# Patient Record
Sex: FEMALE | Race: WHITE | NOT HISPANIC OR LATINO | URBAN - METROPOLITAN AREA
[De-identification: names, ages, dates, MRNs, and addresses within clinical notes are randomized per-mention and may not be internally consistent; named-entity substitution may affect disease eponyms.]

---

## 2018-02-25 ENCOUNTER — EMERGENCY (EMERGENCY)
Facility: HOSPITAL | Age: 53
LOS: 1 days | Discharge: HOME | End: 2018-02-25
Attending: EMERGENCY MEDICINE | Admitting: EMERGENCY MEDICINE
Payer: MEDICAID

## 2018-02-25 VITALS
HEART RATE: 71 BPM | DIASTOLIC BLOOD PRESSURE: 65 MMHG | OXYGEN SATURATION: 97 % | TEMPERATURE: 100 F | RESPIRATION RATE: 18 BRPM | SYSTOLIC BLOOD PRESSURE: 128 MMHG

## 2018-02-25 VITALS
OXYGEN SATURATION: 97 % | RESPIRATION RATE: 18 BRPM | HEART RATE: 86 BPM | DIASTOLIC BLOOD PRESSURE: 71 MMHG | SYSTOLIC BLOOD PRESSURE: 99 MMHG | TEMPERATURE: 99 F

## 2018-02-25 LAB — S PYO AG SPEC QL IA: NEGATIVE — SIGNIFICANT CHANGE UP

## 2018-02-25 PROCEDURE — 87880 STREP A ASSAY W/OPTIC: CPT

## 2018-02-25 PROCEDURE — 99283 EMERGENCY DEPT VISIT LOW MDM: CPT

## 2018-02-25 PROCEDURE — 87081 CULTURE SCREEN ONLY: CPT

## 2018-02-25 PROCEDURE — 99282 EMERGENCY DEPT VISIT SF MDM: CPT

## 2018-02-25 RX ORDER — FLUTICASONE PROPIONATE 50 MCG
2 SPRAY, SUSPENSION NASAL
Qty: 1 | Refills: 0
Start: 2018-02-25 | End: 2018-03-01

## 2018-02-25 RX ORDER — FLUTICASONE PROPIONATE 50 MCG
2 SPRAY, SUSPENSION NASAL
Qty: 1 | Refills: 0 | OUTPATIENT
Start: 2018-02-25 | End: 2018-03-01

## 2018-02-25 RX ORDER — PSEUDOEPHEDRINE HCL 30 MG
1 TABLET ORAL
Qty: 10 | Refills: 0
Start: 2018-02-25 | End: 2018-03-01

## 2018-02-25 NOTE — ED ADULT NURSE NOTE - OBJECTIVE STATEMENT
51 y/o F patient presents to ED from home c/o sore throat and congestion x2 days. Patient states she has an intermittent dull left sided chest pain with cough occasionally. Patient states she took Advil this morning with some relief of symptoms. Patient denies sick contacts at home. Upon assessment, patient is A&Ox3. speech clear and coherent. throat free of redness, swelling, exudate. non tender to touch. lung sounds CTA. skin warm, dry, intact. cap refill <3 sec. abdomen soft, non tender, non distended. ambulatory. Patient denies HA, dizziness, SOB, abdominal pain, N/V/D, bowel/bladder changes, numbness/tingling, dysuria, fevers/chills. VSS. Safety and comfort measures provided and maintained. MD at bedside.

## 2018-02-25 NOTE — ED PROVIDER NOTE - ENMT, MLM
Airway patent, Nasal mucosa clear. Mouth with normal mucosa. Throat has no vesicles, no oropharyngeal exudates and uvula is midline, mild erythema to posterior pharynx. Airway patent, Nasal mucosa clear. Mouth with normal mucosa. Throat has no vesicles, no oropharyngeal exudates and uvula is midline, mild erythema to posterior pharynx. no maxillary or frontal sinus tenderness.

## 2018-02-25 NOTE — ED PROVIDER NOTE - ENMT NEGATIVE STATEMENT, MLM
Ears: no ear pain and no hearing problems.Mouth/Throat: no dysphagia, no hoarseness .Neck: no lumps, no pain, no stiffness and no swollen glands.

## 2018-02-25 NOTE — ED PROVIDER NOTE - OBJECTIVE STATEMENT
51 yo female with PMH of hypothyroidism, tubal ligation seen for 2 day history of nasal congestion, worsening throat and neck pain which started abruptly and has been worsening, throat pain worse when eating/drinking and moving neck. no recent trauma. she took advil once yesterday which did not alleviate symptoms, did not take anything today to relieve symptoms. notes dry cough since symptoms started. Notes subjective chills on and off since last night, no noted fevers, did not take temperature. She does not have any sick contacts, did not have flu vaccine this year. 53 yo female with PMH of hypothyroidism, tubal ligation seen for 2 day history of nasal congestion, dry cough, worsening throat and neck pain which started abruptly and has been worsening, throat pain worse when eating/drinking and moving neck. no recent trauma. she took advil once yesterday which did not alleviate symptoms, did not take anything today to relieve symptoms. Notes subjective chills on and off since last night, no noted fevers, did not take temperature. She does not have any sick contacts, no nausea, vomiting, diarrhea, constipation, abd pain, myalgias, decreased appetite, or increased fatigue. 51 yo female with PMH of athyroidism, tubal ligation seen for 2 day history of nasal congestion, dry cough, worsening throat and neck pain which started abruptly and has been worsening, throat pain worse when eating/drinking and moving neck. no recent trauma. she took advil once yesterday which did not alleviate symptoms, did not take anything today to relieve symptoms. Notes subjective chills on and off since last night, no noted fevers, did not take temperature. She does not have any sick contacts, no nausea, vomiting, diarrhea, constipation, abd pain, myalgias, decreased appetite, or increased fatigue.

## 2018-02-25 NOTE — ED PROVIDER NOTE - PROGRESS NOTE DETAILS
Patient was evaluated by me, found in no acute distress, calm, speaking in complete sentences. Upon evalaution, patient found with no breathing difficulty, non-toxic appearance, tolerating secretions adequately, with mild pharyngeal erythema with no secretions, no tonsillar swelling, with mild anterior cervical lymphadenopathy. Symptoms and findings are likely due to viral URI. Rapid strep ordered. Will f/u on results and patient's clinical progression to determine final disposition. Will discharge with prescription for Flonase if strep test is negative. I agree with PA assessment and plan. Dr. Carlos Richardson Rapid strep test is negative. Prescriptions for sudafed and flonase sent to patient's pharmacy. Recommendations for rest and follow-up with PMD in 24-48 hours were given to patient. Will discharge home. -Dr. Carlos Richardson

## 2018-09-24 PROBLEM — E03.9 HYPOTHYROIDISM, UNSPECIFIED: Chronic | Status: ACTIVE | Noted: 2018-02-25

## 2018-10-15 ENCOUNTER — APPOINTMENT (OUTPATIENT)
Dept: OBGYN | Facility: CLINIC | Age: 53
End: 2018-10-15
Payer: MEDICAID

## 2018-10-15 VITALS
BODY MASS INDEX: 22.84 KG/M2 | HEIGHT: 66 IN | SYSTOLIC BLOOD PRESSURE: 104 MMHG | HEART RATE: 67 BPM | WEIGHT: 142.1 LBS | DIASTOLIC BLOOD PRESSURE: 69 MMHG

## 2018-10-15 DIAGNOSIS — Z87.42 PERSONAL HISTORY OF OTHER DISEASES OF THE FEMALE GENITAL TRACT: ICD-10-CM

## 2018-10-15 DIAGNOSIS — Z80.3 FAMILY HISTORY OF MALIGNANT NEOPLASM OF BREAST: ICD-10-CM

## 2018-10-15 PROCEDURE — 99386 PREV VISIT NEW AGE 40-64: CPT

## 2018-10-17 LAB
BACTERIA UR CULT: NORMAL
C TRACH RRNA SPEC QL NAA+PROBE: NOT DETECTED
HBV SURFACE AG SER QL: NONREACTIVE
HCV AB SER QL: NONREACTIVE
HCV S/CO RATIO: 0.19 S/CO
HIV1+2 AB SPEC QL IA.RAPID: NONREACTIVE
HPV HIGH+LOW RISK DNA PNL CVX: NOT DETECTED
N GONORRHOEA RRNA SPEC QL NAA+PROBE: NOT DETECTED
SOURCE AMPLIFICATION: NORMAL
T PALLIDUM AB SER QL IA: NEGATIVE

## 2018-10-22 LAB — CYTOLOGY CVX/VAG DOC THIN PREP: NORMAL

## 2018-12-05 ENCOUNTER — APPOINTMENT (OUTPATIENT)
Dept: OPHTHALMOLOGY | Facility: CLINIC | Age: 53
End: 2018-12-05
Payer: MEDICAID

## 2018-12-05 PROCEDURE — 92134 CPTRZ OPH DX IMG PST SGM RTA: CPT

## 2018-12-05 PROCEDURE — 92004 COMPRE OPH EXAM NEW PT 1/>: CPT

## 2019-04-10 ENCOUNTER — APPOINTMENT (OUTPATIENT)
Dept: OPHTHALMOLOGY | Facility: CLINIC | Age: 54
End: 2019-04-10

## 2019-06-26 ENCOUNTER — RESULT REVIEW (OUTPATIENT)
Age: 54
End: 2019-06-26

## 2019-10-14 ENCOUNTER — NON-APPOINTMENT (OUTPATIENT)
Age: 54
End: 2019-10-14

## 2019-10-14 ENCOUNTER — APPOINTMENT (OUTPATIENT)
Dept: INTERNAL MEDICINE | Facility: CLINIC | Age: 54
End: 2019-10-14
Payer: MEDICAID

## 2019-10-14 VITALS
BODY MASS INDEX: 23.23 KG/M2 | TEMPERATURE: 98.2 F | OXYGEN SATURATION: 96 % | WEIGHT: 127.87 LBS | HEIGHT: 62.2 IN | DIASTOLIC BLOOD PRESSURE: 63 MMHG | HEART RATE: 69 BPM | SYSTOLIC BLOOD PRESSURE: 106 MMHG

## 2019-10-14 DIAGNOSIS — H04.123 DRY EYE SYNDROME OF BILATERAL LACRIMAL GLANDS: ICD-10-CM

## 2019-10-14 DIAGNOSIS — F51.01 PRIMARY INSOMNIA: ICD-10-CM

## 2019-10-14 PROCEDURE — 36415 COLL VENOUS BLD VENIPUNCTURE: CPT

## 2019-10-14 PROCEDURE — 99386 PREV VISIT NEW AGE 40-64: CPT | Mod: 25

## 2019-10-14 PROCEDURE — 93000 ELECTROCARDIOGRAM COMPLETE: CPT

## 2019-10-14 PROCEDURE — G0296 VISIT TO DETERM LDCT ELIG: CPT

## 2019-10-14 PROCEDURE — 99203 OFFICE O/P NEW LOW 30 MIN: CPT | Mod: 25

## 2019-10-14 PROCEDURE — 99406 BEHAV CHNG SMOKING 3-10 MIN: CPT

## 2019-10-14 PROCEDURE — 81003 URINALYSIS AUTO W/O SCOPE: CPT | Mod: QW

## 2019-10-14 NOTE — HEALTH RISK ASSESSMENT
[Good] : ~his/her~ current health as good [Patient reported mammogram was normal] : Patient reported mammogram was normal [de-identified] : none [de-identified] : none [FreeTextEntry1] : globus sensation, insomnia [MammogramDate] : 07/2019 [ColonoscopyDate] : 6/2017

## 2019-10-14 NOTE — ASSESSMENT
[FreeTextEntry1] : 1) Annual physical: colonoscopy and mammogram up to date, patient declined influenza or Pneumovax her smoking history\par 2) globus sensation:Patient has no reflex symptoms however given his smoking history ENT referral placed for nasoendoscopy.\par 3) Insomnia: Advised to decrease caffeine intake, also discussed importance of exercise advised to start melatonin\par 4) Dry eyes:  artificial tears prescribed for patient\par 5) chest pain: Advised to followup with cardiologist, may be secondary to reflux. Advised lifestyle modification and over-the-counter reflux medication\par 6) cigarette and nicotine dependence: Discussed options for quitting patient noted that on her own patient aware of risks of smoking

## 2019-10-14 NOTE — COUNSELING
[Risk of tobacco use and health benefits of smoking cessation discussed] : Risk of tobacco use and health benefits of smoking cessation discussed [Cessation strategies including cessation program discussed] : Cessation strategies including cessation program discussed [Encouraged to pick a quit date and identify support needed to quit] : Encouraged to pick a quit date and identify support needed to quit [FreeTextEntry1] : 5 [Tobacco Use Cessation Intermediate Greater Than 3 Minutes Up to 10 Minutes] : Tobacco Use Cessation Intermediate Greater Than 3 Minutes Up to 10 Minutes [Willing to Quit Smoking] : Willing to quit smoking [ - Annual Lung Cancer Screening/Share Decision Making Discussion] : Annual Lung Cancer Screening/Share Decision Making Discussion. (I have advised this patient to have a Low Dose CT (LDCT) scan of the lungs and have discussed the following with the patient in a shared decision making discussion:   Benefits of Detection and Early Treatment: There is adequate evidence that annual screening for lung cancer with LDCT in a population of high-risk persons can prevent a substantial number of lung cancer–related deaths. The magnitude of benefit depends on the individual patient's risk for lung cancer, as those who are at highest risk are most likely to benefit. Screening cannot prevent most lung cancer–related deaths, and does not replace smoking cessation. Harms of Detection and Early Intervention and Treatment: The harms associated with LDCT screening include false-negative and false-positive results, incidental findings, over diagnosis, and radiation exposure. False-positive LDCT results occur in a substantial proportion of screened persons; 95% of all positive results do not lead to a diagnosis of cancer. In a high-quality screening program, further imaging can resolve most false-positive results; however, some patients may require invasive procedures. Radiation harms, including cancer resulting from cumulative exposure to radiation, vary depending on the age at the start of screening; the number of scans received; and the person's exposure to other sources of radiation, particularly other medical imaging.)

## 2019-10-14 NOTE — HISTORY OF PRESENT ILLNESS
[de-identified] : Patient presents for annual physical and also for concerns.Patient states that she has a globus sensation for the past 5 years. Feels it when eating and drinking. Has been progressively worsening. Patient also has been smoking a pack a day for approximately 30-40 years.Patient denies any hoarseness or intentional weight loss or regurgitation of food. Patient denies any reflux. Patient denies any cough pain with eating or drinking. patient denies any pain or swelling. Does not see any association with any new medications. Patient also states that she has insomnia for the past few months.Has difficulty maintaining sleep, will rise after an hour of sleep. Denies any pain or shortness of breath while sleeping. denies any restless leg symptoms. Drinks coffee until 10 pm at night. sometimes feels heaviness in the left eye denies any blurry vision. Plans on seeing ophthalmologist. patient also feels occasional chest tightness lasting for a couple of minutes does not associated with exertion denies any palpitations. [FreeTextEntry1] : 54 year old female with past medical history of hypothyroidism presents to establish care.

## 2019-10-15 ENCOUNTER — LABORATORY RESULT (OUTPATIENT)
Age: 54
End: 2019-10-15

## 2019-10-15 ENCOUNTER — RX RENEWAL (OUTPATIENT)
Age: 54
End: 2019-10-15

## 2019-10-15 ENCOUNTER — APPOINTMENT (OUTPATIENT)
Dept: INTERNAL MEDICINE | Facility: CLINIC | Age: 54
End: 2019-10-15
Payer: MEDICAID

## 2019-10-15 PROCEDURE — 36415 COLL VENOUS BLD VENIPUNCTURE: CPT

## 2019-10-17 ENCOUNTER — MESSAGE (OUTPATIENT)
Age: 54
End: 2019-10-17

## 2019-10-17 ENCOUNTER — RX RENEWAL (OUTPATIENT)
Age: 54
End: 2019-10-17

## 2019-10-17 LAB
25(OH)D3 SERPL-MCNC: 39.1 NG/ML
ALBUMIN SERPL ELPH-MCNC: 4.4 G/DL
ALP BLD-CCNC: 78 U/L
ALT SERPL-CCNC: 15 U/L
ANION GAP SERPL CALC-SCNC: 12 MMOL/L
AST SERPL-CCNC: 13 U/L
BASOPHILS # BLD AUTO: 0.06 K/UL
BASOPHILS NFR BLD AUTO: 0.9 %
BILIRUB SERPL-MCNC: 0.2 MG/DL
BUN SERPL-MCNC: 24 MG/DL
CALCIUM SERPL-MCNC: 9.4 MG/DL
CHLORIDE SERPL-SCNC: 104 MMOL/L
CHOLEST SERPL-MCNC: 183 MG/DL
CHOLEST/HDLC SERPL: 2.1 RATIO
CO2 SERPL-SCNC: 26 MMOL/L
CREAT SERPL-MCNC: 0.72 MG/DL
EOSINOPHIL # BLD AUTO: 0.16 K/UL
EOSINOPHIL NFR BLD AUTO: 2.3 %
ESTIMATED AVERAGE GLUCOSE: 117 MG/DL
GLUCOSE SERPL-MCNC: 103 MG/DL
HBA1C MFR BLD HPLC: 5.7 %
HCT VFR BLD CALC: 42.5 %
HDLC SERPL-MCNC: 88 MG/DL
HGB BLD-MCNC: 13.9 G/DL
IMM GRANULOCYTES NFR BLD AUTO: 0.1 %
LDLC SERPL CALC-MCNC: 86 MG/DL
LYMPHOCYTES # BLD AUTO: 2.91 K/UL
LYMPHOCYTES NFR BLD AUTO: 41.3 %
MAN DIFF?: NORMAL
MCHC RBC-ENTMCNC: 31.4 PG
MCHC RBC-ENTMCNC: 32.7 GM/DL
MCV RBC AUTO: 95.9 FL
MONOCYTES # BLD AUTO: 0.43 K/UL
MONOCYTES NFR BLD AUTO: 6.1 %
NEUTROPHILS # BLD AUTO: 3.48 K/UL
NEUTROPHILS NFR BLD AUTO: 49.3 %
PLATELET # BLD AUTO: 280 K/UL
POTASSIUM SERPL-SCNC: 4.5 MMOL/L
PROT SERPL-MCNC: 6.3 G/DL
RBC # BLD: 4.43 M/UL
RBC # FLD: 13.2 %
SODIUM SERPL-SCNC: 142 MMOL/L
TRIGL SERPL-MCNC: 45 MG/DL
TSH SERPL-ACNC: 0.1 UIU/ML
WBC # FLD AUTO: 7.05 K/UL

## 2019-10-22 VITALS — BODY MASS INDEX: 22.2 KG/M2 | HEIGHT: 64 IN | WEIGHT: 130 LBS

## 2019-10-23 ENCOUNTER — FORM ENCOUNTER (OUTPATIENT)
Age: 54
End: 2019-10-23

## 2019-10-24 ENCOUNTER — OUTPATIENT (OUTPATIENT)
Dept: OUTPATIENT SERVICES | Facility: HOSPITAL | Age: 54
LOS: 1 days | End: 2019-10-24
Payer: MEDICAID

## 2019-10-24 ENCOUNTER — APPOINTMENT (OUTPATIENT)
Dept: CT IMAGING | Facility: CLINIC | Age: 54
End: 2019-10-24
Payer: MEDICAID

## 2019-10-24 DIAGNOSIS — F17.210 NICOTINE DEPENDENCE, CIGARETTES, UNCOMPLICATED: ICD-10-CM

## 2019-10-24 PROCEDURE — G0297: CPT

## 2019-10-24 PROCEDURE — G0297: CPT | Mod: 26

## 2019-12-04 ENCOUNTER — APPOINTMENT (OUTPATIENT)
Dept: INTERNAL MEDICINE | Facility: CLINIC | Age: 54
End: 2019-12-04
Payer: MEDICAID

## 2019-12-04 PROCEDURE — 36415 COLL VENOUS BLD VENIPUNCTURE: CPT

## 2019-12-05 LAB — TSH SERPL-ACNC: 0.27 UIU/ML

## 2019-12-17 ENCOUNTER — EMERGENCY (EMERGENCY)
Facility: HOSPITAL | Age: 54
LOS: 1 days | Discharge: HOME | End: 2019-12-17
Attending: EMERGENCY MEDICINE
Payer: COMMERCIAL

## 2019-12-17 VITALS
DIASTOLIC BLOOD PRESSURE: 75 MMHG | HEIGHT: 62 IN | TEMPERATURE: 98 F | RESPIRATION RATE: 18 BRPM | SYSTOLIC BLOOD PRESSURE: 117 MMHG | WEIGHT: 134.92 LBS | HEART RATE: 72 BPM | OXYGEN SATURATION: 97 %

## 2019-12-17 VITALS
OXYGEN SATURATION: 98 % | RESPIRATION RATE: 18 BRPM | SYSTOLIC BLOOD PRESSURE: 120 MMHG | DIASTOLIC BLOOD PRESSURE: 70 MMHG | HEART RATE: 80 BPM

## 2019-12-17 PROCEDURE — 73130 X-RAY EXAM OF HAND: CPT | Mod: 26,RT

## 2019-12-17 PROCEDURE — 99283 EMERGENCY DEPT VISIT LOW MDM: CPT

## 2019-12-17 PROCEDURE — 73110 X-RAY EXAM OF WRIST: CPT | Mod: 26,RT

## 2019-12-17 PROCEDURE — 73110 X-RAY EXAM OF WRIST: CPT

## 2019-12-17 PROCEDURE — 73130 X-RAY EXAM OF HAND: CPT

## 2019-12-17 PROCEDURE — 72100 X-RAY EXAM L-S SPINE 2/3 VWS: CPT | Mod: 26

## 2019-12-17 PROCEDURE — 72100 X-RAY EXAM L-S SPINE 2/3 VWS: CPT

## 2019-12-17 PROCEDURE — 99284 EMERGENCY DEPT VISIT MOD MDM: CPT

## 2019-12-17 RX ORDER — THYROID 120 MG
0 TABLET ORAL
Qty: 0 | Refills: 0 | DISCHARGE

## 2019-12-17 RX ORDER — LIDOCAINE 4 G/100G
1 CREAM TOPICAL ONCE
Refills: 0 | Status: COMPLETED | OUTPATIENT
Start: 2019-12-17 | End: 2019-12-17

## 2019-12-17 RX ORDER — ACETAMINOPHEN 500 MG
975 TABLET ORAL ONCE
Refills: 0 | Status: COMPLETED | OUTPATIENT
Start: 2019-12-17 | End: 2019-12-17

## 2019-12-17 RX ORDER — IBUPROFEN 200 MG
600 TABLET ORAL ONCE
Refills: 0 | Status: COMPLETED | OUTPATIENT
Start: 2019-12-17 | End: 2019-12-17

## 2019-12-17 RX ADMIN — Medication 600 MILLIGRAM(S): at 14:49

## 2019-12-17 RX ADMIN — Medication 975 MILLIGRAM(S): at 14:48

## 2019-12-17 RX ADMIN — Medication 975 MILLIGRAM(S): at 16:03

## 2019-12-17 RX ADMIN — LIDOCAINE 1 PATCH: 4 CREAM TOPICAL at 14:52

## 2019-12-17 RX ADMIN — Medication 600 MILLIGRAM(S): at 16:03

## 2019-12-17 NOTE — ED ADULT NURSE NOTE - OBJECTIVE STATEMENT
[id number] 53 y/o f to er by ems from [facility].ambulatory  now c/o r hand,low back and neck pain ,sb on, no ab. no numbness or tingling, no loc or head pain.

## 2019-12-17 NOTE — ED PROVIDER NOTE - PATIENT PORTAL LINK FT
You can access the FollowMyHealth Patient Portal offered by [facility] by registering at the following website: [email or web address]. By joining [employer]’s FollowMyHealth portal, you will also be able to view your health information using other applications (apps) compatible with our system.

## 2019-12-17 NOTE — ED PROVIDER NOTE - ATTENDING CONTRIBUTION TO CARE
Pt with R wrist pain, td over scaphoid, and LBP, tender lower lumbar midline and R paraspinal, no numbness, no head injury.
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
## 2019-12-17 NOTE — ED PROVIDER NOTE - NSFOLLOWUPINSTRUCTIONS_ED_ALL_ED_FT
follow up with / [facility] orthopedist in the next week  REturn to the ER for any concerns  Keep splint in place- do not remove or get wet.  -- Please use 650mg Tylenol (also called acetaminophen) every 4 hours & 600mg Motrin (also called Advil or ibuprofen) every 6 hours as needed for pain/discomfort/swelling. You can get these without a prescription. Don't use more than 3500mg of Tylenol in any 24-hour period. Make sure your other prescription/over-the-counter medications don't contain any Tylenol so you don't take too much. If you have any stomach discomfort while taking Motrin, you can use TUMS or Pepcid or Zantac (these can all be bought without a prescription).   you may apply ice packs to all sore areas for 20 min at a time
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
## 2020-01-01 NOTE — ED PROVIDER NOTE - NS_EDPROVIDERDISPOUSERTYPE_ED_A_ED
Dr. Campa,   Phone: (   )    -  Fax: (   )    -  Follow Up Time:
Attending Attestation (For Attendings USE Only)...

## 2020-01-26 ENCOUNTER — EMERGENCY (EMERGENCY)
Facility: HOSPITAL | Age: 55
LOS: 1 days | Discharge: HOME | End: 2020-01-26
Attending: EMERGENCY MEDICINE
Payer: MEDICAID

## 2020-01-26 VITALS
DIASTOLIC BLOOD PRESSURE: 85 MMHG | WEIGHT: 139.99 LBS | SYSTOLIC BLOOD PRESSURE: 161 MMHG | RESPIRATION RATE: 17 BRPM | TEMPERATURE: 97 F | HEIGHT: 62 IN | HEART RATE: 74 BPM | OXYGEN SATURATION: 96 %

## 2020-01-26 LAB
APPEARANCE UR: CLEAR — SIGNIFICANT CHANGE UP
BACTERIA # UR AUTO: NEGATIVE — SIGNIFICANT CHANGE UP
BILIRUB UR-MCNC: NEGATIVE — SIGNIFICANT CHANGE UP
COLOR SPEC: COLORLESS — SIGNIFICANT CHANGE UP
DIFF PNL FLD: NEGATIVE — SIGNIFICANT CHANGE UP
EPI CELLS # UR: 3 /HPF — SIGNIFICANT CHANGE UP
GLUCOSE UR QL: NEGATIVE — SIGNIFICANT CHANGE UP
HYALINE CASTS # UR AUTO: 0 /LPF — SIGNIFICANT CHANGE UP (ref 0–7)
KETONES UR-MCNC: NEGATIVE — SIGNIFICANT CHANGE UP
LEUKOCYTE ESTERASE UR-ACNC: NEGATIVE — SIGNIFICANT CHANGE UP
NITRITE UR-MCNC: NEGATIVE — SIGNIFICANT CHANGE UP
PH UR: 6.5 — SIGNIFICANT CHANGE UP (ref 5–8)
PROT UR-MCNC: NEGATIVE — SIGNIFICANT CHANGE UP
RBC CASTS # UR COMP ASSIST: 1 /HPF — SIGNIFICANT CHANGE UP (ref 0–4)
SP GR SPEC: 1 — LOW (ref 1.01–1.02)
UROBILINOGEN FLD QL: NEGATIVE — SIGNIFICANT CHANGE UP
WBC UR QL: 2 /HPF — SIGNIFICANT CHANGE UP (ref 0–5)

## 2020-01-26 PROCEDURE — 99284 EMERGENCY DEPT VISIT MOD MDM: CPT

## 2020-01-26 PROCEDURE — 70450 CT HEAD/BRAIN W/O DYE: CPT | Mod: 26

## 2020-01-26 RX ORDER — METOCLOPRAMIDE HCL 10 MG
10 TABLET ORAL ONCE
Refills: 0 | Status: COMPLETED | OUTPATIENT
Start: 2020-01-26 | End: 2020-01-26

## 2020-01-26 RX ORDER — SODIUM CHLORIDE 9 MG/ML
1000 INJECTION INTRAMUSCULAR; INTRAVENOUS; SUBCUTANEOUS ONCE
Refills: 0 | Status: COMPLETED | OUTPATIENT
Start: 2020-01-26 | End: 2020-01-26

## 2020-01-26 RX ADMIN — Medication 10 MILLIGRAM(S): at 23:52

## 2020-01-26 RX ADMIN — SODIUM CHLORIDE 1000 MILLILITER(S): 9 INJECTION INTRAMUSCULAR; INTRAVENOUS; SUBCUTANEOUS at 23:52

## 2020-01-26 NOTE — ED ADULT NURSE NOTE - OBJECTIVE STATEMENT
Patient 54 year old female, A/O x4. Patient came in due to headache and high blood pressure. Patient stated headache began this morning and lasted all day. Patient describes pain as pressure in her temporal regions bilaterally. Patient states this headache is worse than her normal headaches and the elevated BP. BP assessed in ED- 124/76. Patient also complaining of nausea, dizziness, and decreased eating today. PMH- Aneurysm 2 years ago. Medications- Levothyroxine. Neuro assessment- pupils 3mm, round, reactive, denies blurred/ double vision, full ROM of all four extremities, denies numbness/ tingling Patient 54 year old female, A/O x4. Patient came in due to headache and high blood pressure. Patient stated headache began this morning and lasted all day. Patient describes pain as pressure in her temporal regions bilaterally. Patient states this headache is worse than her normal headaches and the elevated BP. BP assessed in ED- 124/76. Patient also complaining of nausea, dizziness, and decreased eating today. PMH- Aneurysm 2 years ago. Medications- Levothyroxine. Neuro assessment- pupils 3mm, round, reactive, denies blurred/ double vision, full ROM of all four extremities, denies numbness/ tingling. Abd. soft, nontender, nondistended. Skin warm, dry, intact. Lungs clear bilaterally. Patient denies chest pain, SOB, fever, cough, chills, N/V/D, constipation, dysuria. IV placed- 18g RT AC. Spouse at bedside. Blankets provided. Side rails up.

## 2020-01-26 NOTE — ED ADULT NURSE NOTE - CHPI ED NUR SYMPTOMS NEG
no change in level of consciousness/no nausea/no numbness/no confusion/no loss of consciousness/no blurred vision/no vomiting/no weakness/no fever

## 2020-01-27 VITALS
SYSTOLIC BLOOD PRESSURE: 101 MMHG | DIASTOLIC BLOOD PRESSURE: 64 MMHG | OXYGEN SATURATION: 96 % | TEMPERATURE: 98 F | HEART RATE: 58 BPM | RESPIRATION RATE: 18 BRPM

## 2020-01-27 LAB
ALBUMIN SERPL ELPH-MCNC: 4.4 G/DL — SIGNIFICANT CHANGE UP (ref 3.3–5)
ALP SERPL-CCNC: 81 U/L — SIGNIFICANT CHANGE UP (ref 40–120)
ALT FLD-CCNC: 24 U/L — SIGNIFICANT CHANGE UP (ref 10–45)
ANION GAP SERPL CALC-SCNC: 15 MMOL/L — SIGNIFICANT CHANGE UP (ref 5–17)
AST SERPL-CCNC: 17 U/L — SIGNIFICANT CHANGE UP (ref 10–40)
BASOPHILS # BLD AUTO: 0.05 K/UL — SIGNIFICANT CHANGE UP (ref 0–0.2)
BASOPHILS NFR BLD AUTO: 0.8 % — SIGNIFICANT CHANGE UP (ref 0–2)
BILIRUB SERPL-MCNC: 0.2 MG/DL — SIGNIFICANT CHANGE UP (ref 0.2–1.2)
BUN SERPL-MCNC: 20 MG/DL — SIGNIFICANT CHANGE UP (ref 7–23)
CALCIUM SERPL-MCNC: 9.2 MG/DL — SIGNIFICANT CHANGE UP (ref 8.4–10.5)
CHLORIDE SERPL-SCNC: 101 MMOL/L — SIGNIFICANT CHANGE UP (ref 96–108)
CO2 SERPL-SCNC: 25 MMOL/L — SIGNIFICANT CHANGE UP (ref 22–31)
CREAT SERPL-MCNC: 0.97 MG/DL — SIGNIFICANT CHANGE UP (ref 0.5–1.3)
EOSINOPHIL # BLD AUTO: 0.09 K/UL — SIGNIFICANT CHANGE UP (ref 0–0.5)
EOSINOPHIL NFR BLD AUTO: 1.5 % — SIGNIFICANT CHANGE UP (ref 0–6)
GLUCOSE SERPL-MCNC: 105 MG/DL — HIGH (ref 70–99)
HCT VFR BLD CALC: 40.2 % — SIGNIFICANT CHANGE UP (ref 34.5–45)
HGB BLD-MCNC: 12.9 G/DL — SIGNIFICANT CHANGE UP (ref 11.5–15.5)
IMM GRANULOCYTES NFR BLD AUTO: 0.3 % — SIGNIFICANT CHANGE UP (ref 0–1.5)
LYMPHOCYTES # BLD AUTO: 2.84 K/UL — SIGNIFICANT CHANGE UP (ref 1–3.3)
LYMPHOCYTES # BLD AUTO: 46.6 % — HIGH (ref 13–44)
MCHC RBC-ENTMCNC: 30.4 PG — SIGNIFICANT CHANGE UP (ref 27–34)
MCHC RBC-ENTMCNC: 32.1 GM/DL — SIGNIFICANT CHANGE UP (ref 32–36)
MCV RBC AUTO: 94.6 FL — SIGNIFICANT CHANGE UP (ref 80–100)
MONOCYTES # BLD AUTO: 0.48 K/UL — SIGNIFICANT CHANGE UP (ref 0–0.9)
MONOCYTES NFR BLD AUTO: 7.9 % — SIGNIFICANT CHANGE UP (ref 2–14)
NEUTROPHILS # BLD AUTO: 2.61 K/UL — SIGNIFICANT CHANGE UP (ref 1.8–7.4)
NEUTROPHILS NFR BLD AUTO: 42.9 % — LOW (ref 43–77)
NRBC # BLD: 0 /100 WBCS — SIGNIFICANT CHANGE UP (ref 0–0)
PLATELET # BLD AUTO: 254 K/UL — SIGNIFICANT CHANGE UP (ref 150–400)
POTASSIUM SERPL-MCNC: 4.1 MMOL/L — SIGNIFICANT CHANGE UP (ref 3.5–5.3)
POTASSIUM SERPL-SCNC: 4.1 MMOL/L — SIGNIFICANT CHANGE UP (ref 3.5–5.3)
PROT SERPL-MCNC: 6.7 G/DL — SIGNIFICANT CHANGE UP (ref 6–8.3)
RBC # BLD: 4.25 M/UL — SIGNIFICANT CHANGE UP (ref 3.8–5.2)
RBC # FLD: 12.2 % — SIGNIFICANT CHANGE UP (ref 10.3–14.5)
SODIUM SERPL-SCNC: 141 MMOL/L — SIGNIFICANT CHANGE UP (ref 135–145)
WBC # BLD: 6.09 K/UL — SIGNIFICANT CHANGE UP (ref 3.8–10.5)
WBC # FLD AUTO: 6.09 K/UL — SIGNIFICANT CHANGE UP (ref 3.8–10.5)

## 2020-01-27 PROCEDURE — 96374 THER/PROPH/DIAG INJ IV PUSH: CPT

## 2020-01-27 PROCEDURE — 99284 EMERGENCY DEPT VISIT MOD MDM: CPT | Mod: 25

## 2020-01-27 PROCEDURE — 81001 URINALYSIS AUTO W/SCOPE: CPT

## 2020-01-27 PROCEDURE — 80053 COMPREHEN METABOLIC PANEL: CPT

## 2020-01-27 PROCEDURE — 70450 CT HEAD/BRAIN W/O DYE: CPT

## 2020-01-27 PROCEDURE — 85027 COMPLETE CBC AUTOMATED: CPT

## 2020-01-27 NOTE — ED PROVIDER NOTE - PATIENT PORTAL LINK FT
You can access the FollowMyHealth Patient Portal offered by [facility] by registering at the following website: [email or web address]. By joining [employer]’s FollowMyHealth portal, you will also be able to view your health information using other applications (apps) compatible with our system.

## 2020-01-27 NOTE — ED PROVIDER NOTE - NS ED ROS FT
CONST: no fevers, no chills  EYES: no pain, no vision changes  ENT: no sore throat, no ear pain, no change in hearing  CV: no chest pain, no leg swelling  RESP: no shortness of breath, no cough  ABD: no abdominal pain, no nausea, no vomiting, no diarrhea  : no dysuria, no flank pain, no hematuria  MSK: no back pain, no extremity pain  NEURO:+ headache or additional neurologic complaints  HEME: no easy bleeding  SKIN:  no rash

## 2020-01-27 NOTE — ED PROVIDER NOTE - OBJECTIVE STATEMENT
55 y/o female with hx headaches and aneurysm presents to the ED c/o headache onset this morning. Patient reports it is located in her bilateral temporal area, intermittent but worsening. No fever or chills. No blurry vision. Reports nausea but no vomiting. Was dx with aneurysm in the past on CTA.

## 2020-01-27 NOTE — ED PROVIDER NOTE - PHYSICAL EXAMINATION
GENERAL: Awake, alert, NAD  HEENT: NC/AT, moist mucous membranes, PERRL, EOMI  LUNGS: CTAB, no wheezes or crackles   CARDIAC: RRR, no m/r/g  ABDOMEN: Soft, normal BS, non tender, non distended, no rebound, no guarding  BACK: No midline spinal tenderness, no CVA tenderness  EXT: No edema, no calf tenderness, 2+ DP pulses bilaterally, no deformities.  NEURO: A&Ox3. Moving all extremities. CN 2-12 intact.  SKIN: Warm and dry. No rash.  PSYCH: Normal affect.

## 2020-01-28 ENCOUNTER — APPOINTMENT (OUTPATIENT)
Dept: INTERNAL MEDICINE | Facility: CLINIC | Age: 55
End: 2020-01-28
Payer: MEDICAID

## 2020-01-28 VITALS
HEIGHT: 63.78 IN | WEIGHT: 140.3 LBS | SYSTOLIC BLOOD PRESSURE: 102 MMHG | HEART RATE: 61 BPM | TEMPERATURE: 97.5 F | OXYGEN SATURATION: 97 % | BODY MASS INDEX: 24.25 KG/M2 | DIASTOLIC BLOOD PRESSURE: 63 MMHG

## 2020-01-28 DIAGNOSIS — R03.0 ELEVATED BLOOD-PRESSURE READING, W/OUT DIAGNOSIS OF HYPERTENSION: ICD-10-CM

## 2020-01-28 DIAGNOSIS — R20.0 ANESTHESIA OF SKIN: ICD-10-CM

## 2020-01-28 PROCEDURE — 36415 COLL VENOUS BLD VENIPUNCTURE: CPT

## 2020-01-28 PROCEDURE — 99214 OFFICE O/P EST MOD 30 MIN: CPT | Mod: 25

## 2020-01-30 NOTE — ASSESSMENT
[FreeTextEntry1] : 1) Given the elevated blood pressure and perioral numbness bilaterally, obtain MRI of the brain r/o any bleed. Patient to followup with neurologist. Blood pressure currently well-controlled. Go to ED with any worsening symptoms or focal deficits.\par 2) Hypothyroidism: check TSH

## 2020-01-30 NOTE — HISTORY OF PRESENT ILLNESS
[FreeTextEntry1] : Patient presents for followup of chronic disease management [de-identified] : Patient states that a few days ago she went to the emergency department because of blood pressure 180 systolic diastolic in the 60s to 70s. Patient had numbness around her mouth. Numbness continues to persist. Patient also has a history of cerebral aneurysm, numbness continues to persist. Denies any weakness, blurry vision, gait instability, hearing loss, urinary retention or incontinence.  Denies any chest pain chest tightness shortness of breath palpitations Denies any anxiety. Has quit smoking.

## 2020-01-30 NOTE — PHYSICAL EXAM
[Normal] : affect was normal and insight and judgment were intact [de-identified] : decreased sensation lower mouth

## 2020-01-31 LAB
25(OH)D3 SERPL-MCNC: 33.8 NG/ML
ALBUMIN SERPL ELPH-MCNC: 4.6 G/DL
ALP BLD-CCNC: 84 U/L
ALT SERPL-CCNC: 18 U/L
ANION GAP SERPL CALC-SCNC: 13 MMOL/L
APPEARANCE: CLEAR
APPEARANCE: CLEAR
AST SERPL-CCNC: 15 U/L
BACTERIA: NEGATIVE
BASOPHILS # BLD AUTO: 0.07 K/UL
BASOPHILS NFR BLD AUTO: 1.3 %
BILIRUB SERPL-MCNC: 0.2 MG/DL
BILIRUBIN URINE: NEGATIVE
BILIRUBIN URINE: NEGATIVE
BLOOD URINE: NEGATIVE
BLOOD URINE: NEGATIVE
BUN SERPL-MCNC: 19 MG/DL
CALCIUM SERPL-MCNC: 9.4 MG/DL
CHLORIDE SERPL-SCNC: 105 MMOL/L
CHOLEST SERPL-MCNC: 184 MG/DL
CHOLEST/HDLC SERPL: 1.9 RATIO
CO2 SERPL-SCNC: 24 MMOL/L
COLOR: COLORLESS
COLOR: COLORLESS
CREAT SERPL-MCNC: 0.79 MG/DL
EOSINOPHIL # BLD AUTO: 0.13 K/UL
EOSINOPHIL NFR BLD AUTO: 2.5 %
ESTIMATED AVERAGE GLUCOSE: 108 MG/DL
GLUCOSE QUALITATIVE U: NEGATIVE
GLUCOSE QUALITATIVE U: NEGATIVE
GLUCOSE SERPL-MCNC: 103 MG/DL
HBA1C MFR BLD HPLC: 5.4 %
HCT VFR BLD CALC: 39.2 %
HDLC SERPL-MCNC: 99 MG/DL
HGB BLD-MCNC: 12.7 G/DL
HYALINE CASTS: 1 /LPF
IMM GRANULOCYTES NFR BLD AUTO: 0.4 %
KETONES URINE: NEGATIVE
KETONES URINE: NEGATIVE
LDLC SERPL CALC-MCNC: 74 MG/DL
LEUKOCYTE ESTERASE URINE: NEGATIVE
LEUKOCYTE ESTERASE URINE: NEGATIVE
LYMPHOCYTES # BLD AUTO: 2.41 K/UL
LYMPHOCYTES NFR BLD AUTO: 46 %
MAN DIFF?: NORMAL
MCHC RBC-ENTMCNC: 31.9 PG
MCHC RBC-ENTMCNC: 32.4 GM/DL
MCV RBC AUTO: 98.5 FL
MICROSCOPIC-UA: NORMAL
MONOCYTES # BLD AUTO: 0.38 K/UL
MONOCYTES NFR BLD AUTO: 7.3 %
NEUTROPHILS # BLD AUTO: 2.23 K/UL
NEUTROPHILS NFR BLD AUTO: 42.5 %
NITRITE URINE: NEGATIVE
NITRITE URINE: NEGATIVE
PH URINE: 6
PH URINE: 6
PLATELET # BLD AUTO: 264 K/UL
POTASSIUM SERPL-SCNC: 4.4 MMOL/L
PROT SERPL-MCNC: 6.5 G/DL
PROTEIN URINE: NEGATIVE
PROTEIN URINE: NEGATIVE
RBC # BLD: 3.98 M/UL
RBC # FLD: 12.7 %
RED BLOOD CELLS URINE: 3 /HPF
SODIUM SERPL-SCNC: 141 MMOL/L
SPECIFIC GRAVITY URINE: 1.01
SPECIFIC GRAVITY URINE: 1.01
SQUAMOUS EPITHELIAL CELLS: 5 /HPF
TRIGL SERPL-MCNC: 57 MG/DL
TSH SERPL-ACNC: 2.02 UIU/ML
UROBILINOGEN URINE: NORMAL
UROBILINOGEN URINE: NORMAL
WBC # FLD AUTO: 5.24 K/UL
WHITE BLOOD CELLS URINE: 1 /HPF

## 2020-02-10 ENCOUNTER — APPOINTMENT (OUTPATIENT)
Dept: INTERNAL MEDICINE | Facility: CLINIC | Age: 55
End: 2020-02-10
Payer: MEDICAID

## 2020-02-10 VITALS
OXYGEN SATURATION: 97 % | HEIGHT: 63.39 IN | HEART RATE: 70 BPM | SYSTOLIC BLOOD PRESSURE: 98 MMHG | DIASTOLIC BLOOD PRESSURE: 65 MMHG | BODY MASS INDEX: 24.76 KG/M2 | WEIGHT: 141.5 LBS | TEMPERATURE: 97.7 F

## 2020-02-10 PROCEDURE — 99213 OFFICE O/P EST LOW 20 MIN: CPT

## 2020-02-13 NOTE — HISTORY OF PRESENT ILLNESS
[FreeTextEntry8] : Patient presents with a 4 day history of congestion and cough. Associated with green nasal discharge, hospital similar symptoms. Denies any myalgias rashes headaches. Has decreased sense of smell.

## 2020-02-13 NOTE — ASSESSMENT
[FreeTextEntry1] : 1) sinusitis treat with Z-Brayan and Flonase call office if no improvement or worsening symptoms.

## 2020-02-25 ENCOUNTER — RX RENEWAL (OUTPATIENT)
Age: 55
End: 2020-02-25

## 2020-03-30 ENCOUNTER — APPOINTMENT (OUTPATIENT)
Dept: INTERNAL MEDICINE | Facility: CLINIC | Age: 55
End: 2020-03-30
Payer: MEDICAID

## 2020-03-30 ENCOUNTER — NON-APPOINTMENT (OUTPATIENT)
Age: 55
End: 2020-03-30

## 2020-03-30 VITALS
TEMPERATURE: 98 F | OXYGEN SATURATION: 98 % | HEART RATE: 65 BPM | DIASTOLIC BLOOD PRESSURE: 78 MMHG | SYSTOLIC BLOOD PRESSURE: 115 MMHG

## 2020-03-30 PROCEDURE — 99214 OFFICE O/P EST MOD 30 MIN: CPT

## 2020-03-30 RX ORDER — AZITHROMYCIN 250 MG/1
250 TABLET, FILM COATED ORAL
Qty: 1 | Refills: 0 | Status: DISCONTINUED | COMMUNITY
Start: 2020-02-10 | End: 2020-03-30

## 2020-03-31 NOTE — REVIEW OF SYSTEMS
[Abdominal Pain] : abdominal pain [Heartburn] : heartburn [Negative] : Heme/Lymph [FreeTextEntry9] : chest pain

## 2020-03-31 NOTE — HISTORY OF PRESENT ILLNESS
[FreeTextEntry8] : Patient presents to the office didn't epigastric pain and burning sensation radiating up to the esophagus. Also associated with sour taste in mouth. Did have a nasal endoscopy done approximately 4-5 months ago which showed significant reflux. Last colonoscopy was 3 years ago. Denies any melena dysphagia odynophagia nausea vomiting. Never had endoscopy, no family history of stomach cancer.  Also has chest pain, left sided worse with palpation, no shortness of breath, chest tightness or palpitations.

## 2020-03-31 NOTE — ASSESSMENT
[FreeTextEntry1] : 1) epigastric pain, GERD patient to be on PPI as recommended by her nose and throat also check the ovary. Gastroenterology referral placed given history of colonic polyps and colonoscopy due this year.\par 2) Chest pain: Likely MSK, EKG unremarkable, follow up for mammogram.

## 2020-04-06 LAB — UREA BREATH TEST QL: NEGATIVE

## 2020-05-13 ENCOUNTER — APPOINTMENT (OUTPATIENT)
Dept: ENDOCRINOLOGY | Facility: CLINIC | Age: 55
End: 2020-05-13
Payer: MEDICAID

## 2020-05-13 DIAGNOSIS — Z01.419 ENCOUNTER FOR GYNECOLOGICAL EXAMINATION (GENERAL) (ROUTINE) W/OUT ABNORMAL FINDINGS: ICD-10-CM

## 2020-05-13 DIAGNOSIS — Z87.19 PERSONAL HISTORY OF OTHER DISEASES OF THE DIGESTIVE SYSTEM: ICD-10-CM

## 2020-05-13 DIAGNOSIS — R09.89 OTHER SPECIFIED SYMPTOMS AND SIGNS INVOLVING THE CIRCULATORY AND RESPIRATORY SYSTEMS: ICD-10-CM

## 2020-05-13 DIAGNOSIS — Z87.09 PERSONAL HISTORY OF OTHER DISEASES OF THE RESPIRATORY SYSTEM: ICD-10-CM

## 2020-05-13 DIAGNOSIS — R07.89 OTHER CHEST PAIN: ICD-10-CM

## 2020-05-13 DIAGNOSIS — Z87.898 PERSONAL HISTORY OF OTHER SPECIFIED CONDITIONS: ICD-10-CM

## 2020-05-13 PROCEDURE — 99203 OFFICE O/P NEW LOW 30 MIN: CPT | Mod: 95

## 2020-05-13 NOTE — CONSULT LETTER
[Dear  ___] : Dear  [unfilled], [Consult Letter:] : I had the pleasure of evaluating your patient, [unfilled]. [Please see my note below.] : Please see my note below. [Consult Closing:] : Thank you very much for allowing me to participate in the care of this patient.  If you have any questions, please do not hesitate to contact me. [FreeTextEntry3] : Ирина Rodriguez MD, FACE\par  [Sincerely,] : Sincerely,

## 2020-05-13 NOTE — REASON FOR VISIT
[Home] : at home, [unfilled] , at the time of the visit. [Other Location: e.g. Home (Enter Location, City,State)___] : at [unfilled] [Patient] : the patient [Self] : self [Consultation] : a consultation visit [Hypothyroidism] : hypothyroidism [FreeTextEntry2] : Dr. ANA LAURA Melton

## 2020-05-13 NOTE — ASSESSMENT
[FreeTextEntry1] : This is a 54 year old female with hypothyroidism after total thyroidectomy for benign thyroid nodules. \par TFTs from 1/2020 were normal. \par She was advised to continue LT4 112mcg daily. \par Check TFTs in 6/2020.

## 2020-05-13 NOTE — HISTORY OF PRESENT ILLNESS
[FreeTextEntry1] : Mrs. Ospina was the only participant in this video visit. \par \par CC: Low thyroid \par \par This is a 54 year old female with postoperative hypothyroidism and GERD. \par She reports that she had a total thyroidectomy in 2005 in [place] due to thyroid nodules. She was told the thyroid nodules were benign. She is currently on LT4 112mcg qd. She takes this in the morning on an empty stomach. \par She denies complaints today.

## 2020-06-11 ENCOUNTER — APPOINTMENT (OUTPATIENT)
Dept: INTERNAL MEDICINE | Facility: CLINIC | Age: 55
End: 2020-06-11
Payer: MEDICAID

## 2020-06-11 VITALS — DIASTOLIC BLOOD PRESSURE: 60 MMHG | OXYGEN SATURATION: 99 % | SYSTOLIC BLOOD PRESSURE: 93 MMHG | HEART RATE: 67 BPM

## 2020-06-11 DIAGNOSIS — G44.209 TENSION-TYPE HEADACHE, UNSPECIFIED, NOT INTRACTABLE: ICD-10-CM

## 2020-06-11 DIAGNOSIS — M26.609 UNSPECIFIED TEMPOROMANDIBULAR JOINT DISORDER: ICD-10-CM

## 2020-06-11 PROCEDURE — 36415 COLL VENOUS BLD VENIPUNCTURE: CPT

## 2020-06-11 PROCEDURE — 99213 OFFICE O/P EST LOW 20 MIN: CPT | Mod: 25

## 2020-06-14 NOTE — ASSESSMENT
[FreeTextEntry1] : 1) Headache: Suspect tension, headache, TMJ. Advised heating pads and NSAID's. Neurologic exam wnl. If no improvement in 48-72 hours or new symptoms to call office.\par 2) Hypothyroidism: check TSH\par 3) HLD: check lipid panel, advised low fat diet\par 5) Cerebral aneurysm: had MRI done in January, headache is not new in character. Neurologic exam wnl.

## 2020-06-14 NOTE — HISTORY OF PRESENT ILLNESS
[FreeTextEntry8] : Patient presents for headache, bilaterally has been occurring for 2-3 days. Describes as a pressure sensation. Has had it before. Tylenol alleviates, denies any nausea, vomiting, photophobia, gait instability, last MRI of the brain in January unremarkable. Denies any weakness, numbness, sinus congestion, lacrimation. Would like thyroid checked, feels palpitations.

## 2020-06-14 NOTE — PHYSICAL EXAM
[Normal] : normal gait, coordination grossly intact, no focal deficits and deep tendon reflexes were 2+ and symmetric [de-identified] : tenderness over the left masseter, left sided pain when opening the joint. Clicking of the left joint.

## 2020-06-15 ENCOUNTER — APPOINTMENT (OUTPATIENT)
Dept: ENDOCRINOLOGY | Facility: CLINIC | Age: 55
End: 2020-06-15

## 2020-06-15 LAB
25(OH)D3 SERPL-MCNC: 41.2 NG/ML
ALBUMIN SERPL ELPH-MCNC: 4.7 G/DL
ALP BLD-CCNC: 70 U/L
ALT SERPL-CCNC: 19 U/L
ANION GAP SERPL CALC-SCNC: 15 MMOL/L
APPEARANCE: CLEAR
APPEARANCE: CLEAR
AST SERPL-CCNC: 17 U/L
BACTERIA: NEGATIVE
BASOPHILS # BLD AUTO: 0.07 K/UL
BASOPHILS NFR BLD AUTO: 1.2 %
BILIRUB SERPL-MCNC: 0.2 MG/DL
BILIRUBIN URINE: NEGATIVE
BILIRUBIN URINE: NEGATIVE
BLOOD URINE: NEGATIVE
BLOOD URINE: NEGATIVE
BUN SERPL-MCNC: 16 MG/DL
CALCIUM SERPL-MCNC: 9.6 MG/DL
CHLORIDE SERPL-SCNC: 104 MMOL/L
CHOLEST SERPL-MCNC: 170 MG/DL
CHOLEST/HDLC SERPL: 2.1 RATIO
CO2 SERPL-SCNC: 24 MMOL/L
COLOR: NORMAL
COLOR: NORMAL
CREAT SERPL-MCNC: 0.73 MG/DL
EOSINOPHIL # BLD AUTO: 0.1 K/UL
EOSINOPHIL NFR BLD AUTO: 1.7 %
ESTIMATED AVERAGE GLUCOSE: 111 MG/DL
GLUCOSE QUALITATIVE U: NEGATIVE
GLUCOSE QUALITATIVE U: NEGATIVE
GLUCOSE SERPL-MCNC: 97 MG/DL
HBA1C MFR BLD HPLC: 5.5 %
HCT VFR BLD CALC: 39.1 %
HDLC SERPL-MCNC: 83 MG/DL
HGB BLD-MCNC: 12.9 G/DL
HYALINE CASTS: 0 /LPF
IMM GRANULOCYTES NFR BLD AUTO: 0.3 %
KETONES URINE: NEGATIVE
KETONES URINE: NEGATIVE
LDLC SERPL CALC-MCNC: 75 MG/DL
LEUKOCYTE ESTERASE URINE: NEGATIVE
LEUKOCYTE ESTERASE URINE: NEGATIVE
LYMPHOCYTES # BLD AUTO: 2.48 K/UL
LYMPHOCYTES NFR BLD AUTO: 42.4 %
MAN DIFF?: NORMAL
MCHC RBC-ENTMCNC: 31.4 PG
MCHC RBC-ENTMCNC: 33 GM/DL
MCV RBC AUTO: 95.1 FL
MICROSCOPIC-UA: NORMAL
MONOCYTES # BLD AUTO: 0.45 K/UL
MONOCYTES NFR BLD AUTO: 7.7 %
NEUTROPHILS # BLD AUTO: 2.73 K/UL
NEUTROPHILS NFR BLD AUTO: 46.7 %
NITRITE URINE: NEGATIVE
NITRITE URINE: NEGATIVE
PH URINE: 6
PH URINE: 6
PLATELET # BLD AUTO: 277 K/UL
POTASSIUM SERPL-SCNC: 4.9 MMOL/L
PROT SERPL-MCNC: 6.6 G/DL
PROTEIN URINE: NEGATIVE
PROTEIN URINE: NEGATIVE
RBC # BLD: 4.11 M/UL
RBC # FLD: 12.4 %
RED BLOOD CELLS URINE: 1 /HPF
SODIUM SERPL-SCNC: 143 MMOL/L
SPECIFIC GRAVITY URINE: 1.01
SPECIFIC GRAVITY URINE: 1.01
SQUAMOUS EPITHELIAL CELLS: 2 /HPF
T3FREE SERPL-MCNC: 2.94 PG/ML
T4 FREE SERPL-MCNC: 1.6 NG/DL
TRIGL SERPL-MCNC: 58 MG/DL
TSH SERPL-ACNC: 0.1 UIU/ML
UROBILINOGEN URINE: NORMAL
UROBILINOGEN URINE: NORMAL
VIT B12 SERPL-MCNC: 1019 PG/ML
WBC # FLD AUTO: 5.85 K/UL
WHITE BLOOD CELLS URINE: 1 /HPF

## 2020-07-11 NOTE — PHYSICAL EXAM
Calvin PHAM Zeyad  [id number]    Rapid Response Note      Asked to evaluate patient due to increasing O2 requirements.    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  He was started on TPN today as he is failed multiple swallow eval's.    Patient's O2 requirements have been increasing has gone from 2 L to 4 L to now requiring 12 L partial rebreather in the 50% to maintain O2 sats greater than 90%.  He endorses feeling short of breath, no chest pain.    .  Visit Vitals  /84 (BP Location: LUE - Left upper extremity, Patient Position: Semi-Grey's)   Pulse 98   Temp 97.5 °F (36.4 °C) (Tympanic)   Resp 20   Ht 6' 3\" (1.905 m) Comment: stated   Wt 94.6 kg (208 lb 8.9 oz)   SpO2 90%   BMI 26.07 kg/m²     On physical exam, patient has diffuse crackles and decreased breath sounds.  Poor air entry. +2 pitting edema in upper extremities and trace edema in lower extremities.  Mild tachycardia, regular rhythm, abdomen soft nontender.    ABG obtained shows a PO2 of 68 on 50% FiO2 at 12 L.  T proBNP greater than 2000.    Attempted to give patient 40 mg IV Lasix with minimal urine output about 100 to 200 cc with no improvement in his O2 sats.    Chest x-ray performed shows worsening bilateral interstitial opacities and a worsening left lower lobe consolidation concerning for infectious etiology.    This time, due to rapidly increasing O2 requirements, RRT was called to transfer patient to the MICU for further management due to high risk of decompensation.    Markell Garcia MD           [Normal] : no respiratory distress, lungs were clear to auscultation bilaterally and no accessory muscle use [de-identified] : swollen nasal turbinates

## 2020-09-04 NOTE — ED ADULT TRIAGE NOTE - BP NONINVASIVE SYSTOLIC (MM HG)
Impression: Vitreous degeneration, right eye: H43.811. Plan: Discussed signs and symptoms of retinal detachment. Will continue to observe condition and or symptoms. Patient instructed to call if condition gets worse. 117

## 2020-10-26 ENCOUNTER — APPOINTMENT (OUTPATIENT)
Dept: INTERNAL MEDICINE | Facility: CLINIC | Age: 55
End: 2020-10-26
Payer: MEDICAID

## 2020-10-26 VITALS
HEART RATE: 63 BPM | HEIGHT: 62.99 IN | BODY MASS INDEX: 24.37 KG/M2 | TEMPERATURE: 98 F | DIASTOLIC BLOOD PRESSURE: 56 MMHG | WEIGHT: 137.55 LBS | OXYGEN SATURATION: 97 % | SYSTOLIC BLOOD PRESSURE: 90 MMHG

## 2020-10-26 DIAGNOSIS — R21 RASH AND OTHER NONSPECIFIC SKIN ERUPTION: ICD-10-CM

## 2020-10-26 DIAGNOSIS — Z23 ENCOUNTER FOR IMMUNIZATION: ICD-10-CM

## 2020-10-26 PROCEDURE — G0008: CPT

## 2020-10-26 PROCEDURE — 99072 ADDL SUPL MATRL&STAF TM PHE: CPT

## 2020-10-26 PROCEDURE — 99396 PREV VISIT EST AGE 40-64: CPT | Mod: 25

## 2020-10-26 PROCEDURE — 90682 RIV4 VACC RECOMBINANT DNA IM: CPT

## 2020-10-26 PROCEDURE — 99214 OFFICE O/P EST MOD 30 MIN: CPT | Mod: 25

## 2020-11-01 LAB
25(OH)D3 SERPL-MCNC: 43.5 NG/ML
ALBUMIN SERPL ELPH-MCNC: 4.5 G/DL
ALP BLD-CCNC: 87 U/L
ALT SERPL-CCNC: 16 U/L
ANION GAP SERPL CALC-SCNC: 16 MMOL/L
APPEARANCE: CLEAR
APPEARANCE: CLEAR
AST SERPL-CCNC: 18 U/L
BACTERIA: ABNORMAL
BASOPHILS # BLD AUTO: 0.06 K/UL
BASOPHILS NFR BLD AUTO: 0.8 %
BILIRUB SERPL-MCNC: 0.2 MG/DL
BILIRUBIN URINE: NEGATIVE
BILIRUBIN URINE: NEGATIVE
BLOOD URINE: NEGATIVE
BLOOD URINE: NEGATIVE
BUN SERPL-MCNC: 21 MG/DL
CALCIUM SERPL-MCNC: 10 MG/DL
CHLORIDE SERPL-SCNC: 100 MMOL/L
CHOLEST SERPL-MCNC: 221 MG/DL
CO2 SERPL-SCNC: 25 MMOL/L
COLOR: NORMAL
COLOR: NORMAL
CREAT SERPL-MCNC: 0.8 MG/DL
EOSINOPHIL # BLD AUTO: 0.11 K/UL
EOSINOPHIL NFR BLD AUTO: 1.4 %
ESTIMATED AVERAGE GLUCOSE: 114 MG/DL
GLUCOSE QUALITATIVE U: NEGATIVE
GLUCOSE QUALITATIVE U: NEGATIVE
GLUCOSE SERPL-MCNC: 92 MG/DL
HBA1C MFR BLD HPLC: 5.6 %
HCT VFR BLD CALC: 42.1 %
HDLC SERPL-MCNC: 90 MG/DL
HGB BLD-MCNC: 13.6 G/DL
HYALINE CASTS: 1 /LPF
IMM GRANULOCYTES NFR BLD AUTO: 0.4 %
KETONES URINE: NEGATIVE
KETONES URINE: NEGATIVE
LDLC SERPL CALC-MCNC: 115 MG/DL
LEUKOCYTE ESTERASE URINE: NEGATIVE
LEUKOCYTE ESTERASE URINE: NEGATIVE
LYMPHOCYTES # BLD AUTO: 2.89 K/UL
LYMPHOCYTES NFR BLD AUTO: 37.6 %
MAN DIFF?: NORMAL
MCHC RBC-ENTMCNC: 31.2 PG
MCHC RBC-ENTMCNC: 32.3 GM/DL
MCV RBC AUTO: 96.6 FL
MICROSCOPIC-UA: NORMAL
MONOCYTES # BLD AUTO: 0.48 K/UL
MONOCYTES NFR BLD AUTO: 6.2 %
NEUTROPHILS # BLD AUTO: 4.12 K/UL
NEUTROPHILS NFR BLD AUTO: 53.6 %
NITRITE URINE: NEGATIVE
NITRITE URINE: NEGATIVE
NONHDLC SERPL-MCNC: 131 MG/DL
PH URINE: 6
PH URINE: 6
PLATELET # BLD AUTO: 303 K/UL
POTASSIUM SERPL-SCNC: 4.7 MMOL/L
PROT SERPL-MCNC: 6.7 G/DL
PROTEIN URINE: NEGATIVE
PROTEIN URINE: NEGATIVE
RBC # BLD: 4.36 M/UL
RBC # FLD: 13.3 %
RED BLOOD CELLS URINE: 2 /HPF
SODIUM SERPL-SCNC: 141 MMOL/L
SPECIFIC GRAVITY URINE: 1.02
SPECIFIC GRAVITY URINE: 1.02
SQUAMOUS EPITHELIAL CELLS: 3 /HPF
T3FREE SERPL-MCNC: 2.28 PG/ML
T4 FREE SERPL-MCNC: 1.3 NG/DL
TRIGL SERPL-MCNC: 84 MG/DL
TSH SERPL-ACNC: 0.62 UIU/ML
UROBILINOGEN URINE: NORMAL
UROBILINOGEN URINE: NORMAL
VIT B12 SERPL-MCNC: 1054 PG/ML
WBC # FLD AUTO: 7.69 K/UL
WHITE BLOOD CELLS URINE: 2 /HPF

## 2020-11-01 NOTE — HISTORY OF PRESENT ILLNESS
[FreeTextEntry1] : Patient presents for annual physical [de-identified] : Has lump in neck, denies rapidly growing, denies any difficulty with swallowing. Denies any chest pain, shortness of breath or palpitations. Denies any changes in urination or bowel habits. Has skin rash, nodule, non pruritic slightly tender.

## 2020-11-01 NOTE — ASSESSMENT
[FreeTextEntry1] : Blood work done today, CT scan of the lungs ordered. Head and neck U/S given neck lump. Mupirocin given for skin rash. Check lipid panel and HgA1v

## 2020-11-01 NOTE — HEALTH RISK ASSESSMENT
[Good] : ~his/her~  mood as  good [30 or more] : 30 or more [Patient reported mammogram was normal] : Patient reported mammogram was normal [MammogramDate] : 7/2020 [ColonoscopyComments] : to f/u with GI

## 2020-11-05 ENCOUNTER — APPOINTMENT (OUTPATIENT)
Dept: ULTRASOUND IMAGING | Facility: CLINIC | Age: 55
End: 2020-11-05
Payer: MEDICAID

## 2020-11-05 ENCOUNTER — OUTPATIENT (OUTPATIENT)
Dept: OUTPATIENT SERVICES | Facility: HOSPITAL | Age: 55
LOS: 1 days | End: 2020-11-05
Payer: MEDICAID

## 2020-11-05 DIAGNOSIS — F17.210 NICOTINE DEPENDENCE, CIGARETTES, UNCOMPLICATED: ICD-10-CM

## 2020-11-05 PROCEDURE — 76536 US EXAM OF HEAD AND NECK: CPT

## 2020-11-05 PROCEDURE — 76536 US EXAM OF HEAD AND NECK: CPT | Mod: 26

## 2020-12-09 ENCOUNTER — APPOINTMENT (OUTPATIENT)
Dept: ENDOCRINOLOGY | Facility: CLINIC | Age: 55
End: 2020-12-09
Payer: MEDICAID

## 2020-12-09 VITALS
OXYGEN SATURATION: 98 % | SYSTOLIC BLOOD PRESSURE: 100 MMHG | DIASTOLIC BLOOD PRESSURE: 58 MMHG | HEIGHT: 64.37 IN | HEART RATE: 90 BPM | TEMPERATURE: 97.3 F | BODY MASS INDEX: 23.24 KG/M2 | WEIGHT: 136.12 LBS

## 2020-12-09 PROCEDURE — 99213 OFFICE O/P EST LOW 20 MIN: CPT

## 2020-12-09 PROCEDURE — 99072 ADDL SUPL MATRL&STAF TM PHE: CPT

## 2020-12-09 RX ORDER — CYCLOBENZAPRINE HYDROCHLORIDE 10 MG/1
10 TABLET, FILM COATED ORAL 3 TIMES DAILY
Qty: 60 | Refills: 0 | Status: COMPLETED | COMMUNITY
Start: 2020-06-11 | End: 2020-12-09

## 2020-12-09 NOTE — PHYSICAL EXAM
[Alert] : alert [Well Nourished] : well nourished [Healthy Appearance] : healthy appearance [No Acute Distress] : no acute distress [Well Developed] : well developed [Normal Voice/Communication] : normal voice communication [Normal Sclera/Conjunctiva] : normal sclera/conjunctiva [No Proptosis] : no proptosis [No Neck Mass] : no neck mass was observed [No LAD] : no lymphadenopathy [Supple] : the neck was supple [No Thyroid Nodules] : no palpable thyroid nodules [Well Healed Scar] : well healed scar [No Respiratory Distress] : no respiratory distress [No Accessory Muscle Use] : no accessory muscle use [Normal Rate and Effort] : normal respiratory rate and effort [Normal Rate] : heart rate was normal [No Stigmata of Cushings Syndrome] : no stigmata of Cushings Syndrome [Normal Gait] : normal gait [No Clubbing, Cyanosis] : no clubbing  or cyanosis of the fingernails [No Involuntary Movements] : no involuntary movements were seen [Acanthosis Nigricans] : no acanthosis nigricans [Hirsutism] : no hirsutism [No Tremors] : no tremors [Normal Affect] : the affect was normal [Normal Insight/Judgement] : insight and judgment were intact [Normal Mood] : the mood was normal

## 2020-12-09 NOTE — HISTORY OF PRESENT ILLNESS
[FreeTextEntry1] : CC: Low thyroid \par \par This is a 55 year old female with postoperative hypothyroidism and GERD. \par She reports that she had a total thyroidectomy in 2005 in [place] due to thyroid nodules. She was told the thyroid nodules were benign. She is currently on LT4 100mcg qd. She takes this in the morning on an empty stomach. \par She denies complaints today.

## 2020-12-09 NOTE — ASSESSMENT
[FreeTextEntry1] : This is a 55 year old female with hypothyroidism after total thyroidectomy for benign thyroid nodules. \par TFTs from 10/2020 were normal. \par She was advised to continue LT4 100mcg daily. \par She is clinically euthyroid.

## 2021-01-12 ENCOUNTER — NON-APPOINTMENT (OUTPATIENT)
Age: 56
End: 2021-01-12

## 2021-01-12 VITALS — BODY MASS INDEX: 23.22 KG/M2 | HEIGHT: 64 IN | WEIGHT: 136 LBS

## 2021-01-12 DIAGNOSIS — Z12.2 ENCOUNTER FOR SCREENING FOR MALIGNANT NEOPLASM OF RESPIRATORY ORGANS: ICD-10-CM

## 2021-01-12 NOTE — HISTORY OF PRESENT ILLNESS
[Current] : current smoker [_____ pack-years] : [unfilled] pack-years [TextBox_13] : She denies hemoptysis, denies new cough, denies unexplained weight loss.\par She is a current smoker with a 41 pack year smoking history (1PPD x 39 years then 0.5PPD x 3 years).  She denies family h/o lung cancer.  She is referred by Dr. Donnell Melton. \par

## 2021-01-12 NOTE — PLAN
[Age appropriate screenings] : Age appropriate screenings [Smoking Cessation] : smoking cessation [Regular follow-up with healthcare provider] : regular follow-up with healthcare provider [FreeTextEntry1] : Her LDCT is scheduled for 1/13/21 at the [place] location.   She will follow up with Dr. Melton for the results.

## 2021-01-12 NOTE — ASSESSMENT
[Discussed Risks and Advised to Quit Smoking] : Discussed risks and advised to quit smoking [Discussed Cessation Strategies] : cessation strategies were discussed [Not Ready] : Patient is not ready for cessation intervention

## 2021-01-13 ENCOUNTER — APPOINTMENT (OUTPATIENT)
Dept: CT IMAGING | Facility: CLINIC | Age: 56
End: 2021-01-13
Payer: MEDICAID

## 2021-01-13 ENCOUNTER — OUTPATIENT (OUTPATIENT)
Dept: OUTPATIENT SERVICES | Facility: HOSPITAL | Age: 56
LOS: 1 days | End: 2021-01-13
Payer: MEDICAID

## 2021-01-13 ENCOUNTER — RESULT REVIEW (OUTPATIENT)
Age: 56
End: 2021-01-13

## 2021-01-13 DIAGNOSIS — F17.210 NICOTINE DEPENDENCE, CIGARETTES, UNCOMPLICATED: ICD-10-CM

## 2021-01-13 PROCEDURE — 71271 CT THORAX LUNG CANCER SCR C-: CPT

## 2021-01-13 PROCEDURE — 71271 CT THORAX LUNG CANCER SCR C-: CPT | Mod: 26

## 2021-02-22 NOTE — ED ADULT TRIAGE NOTE - NS ED TRIAGE AVPU SCALE
Airway protection Alert-The patient is alert, awake and responds to voice. The patient is oriented to time, place, and person. The triage nurse is able to obtain subjective information.

## 2021-03-25 ENCOUNTER — APPOINTMENT (OUTPATIENT)
Dept: INTERNAL MEDICINE | Facility: CLINIC | Age: 56
End: 2021-03-25
Payer: MEDICAID

## 2021-03-25 VITALS
SYSTOLIC BLOOD PRESSURE: 115 MMHG | TEMPERATURE: 98.6 F | WEIGHT: 141.2 LBS | OXYGEN SATURATION: 98 % | DIASTOLIC BLOOD PRESSURE: 80 MMHG | HEART RATE: 64 BPM | BODY MASS INDEX: 24.11 KG/M2 | HEIGHT: 64 IN

## 2021-03-25 DIAGNOSIS — R13.10 DYSPHAGIA, UNSPECIFIED: ICD-10-CM

## 2021-03-25 DIAGNOSIS — Z12.31 ENCOUNTER FOR SCREENING MAMMOGRAM FOR MALIGNANT NEOPLASM OF BREAST: ICD-10-CM

## 2021-03-25 DIAGNOSIS — Z13.820 ENCOUNTER FOR SCREENING FOR OSTEOPOROSIS: ICD-10-CM

## 2021-03-25 PROCEDURE — 99213 OFFICE O/P EST LOW 20 MIN: CPT

## 2021-03-25 PROCEDURE — 99072 ADDL SUPL MATRL&STAF TM PHE: CPT

## 2021-03-31 LAB
25(OH)D3 SERPL-MCNC: 42.1 NG/ML
ALBUMIN SERPL ELPH-MCNC: 4.6 G/DL
ALP BLD-CCNC: 84 U/L
ALT SERPL-CCNC: 15 U/L
ANION GAP SERPL CALC-SCNC: 11 MMOL/L
APPEARANCE: CLEAR
APPEARANCE: CLEAR
AST SERPL-CCNC: 16 U/L
BACTERIA: NEGATIVE
BASOPHILS # BLD AUTO: 0.08 K/UL
BASOPHILS NFR BLD AUTO: 1.3 %
BILIRUB SERPL-MCNC: 0.2 MG/DL
BILIRUBIN URINE: NEGATIVE
BILIRUBIN URINE: NEGATIVE
BLOOD URINE: NEGATIVE
BLOOD URINE: NEGATIVE
BUN SERPL-MCNC: 19 MG/DL
CALCIUM SERPL-MCNC: 9.9 MG/DL
CHLORIDE SERPL-SCNC: 104 MMOL/L
CHOLEST SERPL-MCNC: 206 MG/DL
CO2 SERPL-SCNC: 26 MMOL/L
COLOR: COLORLESS
COLOR: COLORLESS
CREAT SERPL-MCNC: 0.74 MG/DL
EOSINOPHIL # BLD AUTO: 0.09 K/UL
EOSINOPHIL NFR BLD AUTO: 1.5 %
ESTIMATED AVERAGE GLUCOSE: 114 MG/DL
GLUCOSE QUALITATIVE U: NEGATIVE
GLUCOSE QUALITATIVE U: NEGATIVE
GLUCOSE SERPL-MCNC: 97 MG/DL
HBA1C MFR BLD HPLC: 5.6 %
HCT VFR BLD CALC: 42.1 %
HDLC SERPL-MCNC: 85 MG/DL
HGB BLD-MCNC: 13.5 G/DL
HYALINE CASTS: 0 /LPF
IMM GRANULOCYTES NFR BLD AUTO: 0.3 %
KETONES URINE: NEGATIVE
KETONES URINE: NEGATIVE
LDLC SERPL CALC-MCNC: 114 MG/DL
LEUKOCYTE ESTERASE URINE: NEGATIVE
LEUKOCYTE ESTERASE URINE: NEGATIVE
LYMPHOCYTES # BLD AUTO: 2.29 K/UL
LYMPHOCYTES NFR BLD AUTO: 37.2 %
MAN DIFF?: NORMAL
MCHC RBC-ENTMCNC: 31.8 PG
MCHC RBC-ENTMCNC: 32.1 GM/DL
MCV RBC AUTO: 99.3 FL
MICROSCOPIC-UA: NORMAL
MONOCYTES # BLD AUTO: 0.44 K/UL
MONOCYTES NFR BLD AUTO: 7.1 %
NEUTROPHILS # BLD AUTO: 3.24 K/UL
NEUTROPHILS NFR BLD AUTO: 52.6 %
NITRITE URINE: NEGATIVE
NITRITE URINE: NEGATIVE
NONHDLC SERPL-MCNC: 121 MG/DL
PH URINE: 6.5
PH URINE: 6.5
PLATELET # BLD AUTO: 288 K/UL
POTASSIUM SERPL-SCNC: 4.8 MMOL/L
PROT SERPL-MCNC: 6.8 G/DL
PROTEIN URINE: NEGATIVE
PROTEIN URINE: NEGATIVE
RBC # BLD: 4.24 M/UL
RBC # FLD: 13 %
RED BLOOD CELLS URINE: 1 /HPF
SODIUM SERPL-SCNC: 141 MMOL/L
SPECIFIC GRAVITY URINE: 1
SPECIFIC GRAVITY URINE: 1
SQUAMOUS EPITHELIAL CELLS: 5 /HPF
TRIGL SERPL-MCNC: 37 MG/DL
TSH SERPL-ACNC: 1.35 UIU/ML
UROBILINOGEN URINE: NORMAL
UROBILINOGEN URINE: NORMAL
VIT B12 SERPL-MCNC: 850 PG/ML
WBC # FLD AUTO: 6.16 K/UL
WHITE BLOOD CELLS URINE: 0 /HPF

## 2021-03-31 NOTE — HISTORY OF PRESENT ILLNESS
[FreeTextEntry1] : Patient presents for follow-up  [de-identified] : Feels well denies any chest pain chest tightness shortness of breath denies any orthopnea paroxysmal nocturnal dyspnea.  Has been watching diet has been attempting to limit smoking.

## 2021-03-31 NOTE — ASSESSMENT
[FreeTextEntry1] : Blood work done today, bone density ordered, lipid panel checked to check other risk factors.  Discussed importance of smoking cessation also check thyroid function test.

## 2021-04-24 ENCOUNTER — RX RENEWAL (OUTPATIENT)
Age: 56
End: 2021-04-24

## 2021-04-29 ENCOUNTER — RESULT REVIEW (OUTPATIENT)
Age: 56
End: 2021-04-29

## 2021-04-29 ENCOUNTER — APPOINTMENT (OUTPATIENT)
Dept: ULTRASOUND IMAGING | Facility: IMAGING CENTER | Age: 56
End: 2021-04-29
Payer: MEDICAID

## 2021-04-29 ENCOUNTER — APPOINTMENT (OUTPATIENT)
Dept: MAMMOGRAPHY | Facility: IMAGING CENTER | Age: 56
End: 2021-04-29
Payer: MEDICAID

## 2021-04-29 ENCOUNTER — OUTPATIENT (OUTPATIENT)
Dept: OUTPATIENT SERVICES | Facility: HOSPITAL | Age: 56
LOS: 1 days | End: 2021-04-29
Payer: MEDICAID

## 2021-04-29 ENCOUNTER — APPOINTMENT (OUTPATIENT)
Dept: RADIOLOGY | Facility: IMAGING CENTER | Age: 56
End: 2021-04-29
Payer: MEDICAID

## 2021-04-29 DIAGNOSIS — Z00.8 ENCOUNTER FOR OTHER GENERAL EXAMINATION: ICD-10-CM

## 2021-04-29 PROCEDURE — 77080 DXA BONE DENSITY AXIAL: CPT | Mod: 26

## 2021-04-29 PROCEDURE — 77063 BREAST TOMOSYNTHESIS BI: CPT

## 2021-04-29 PROCEDURE — 77067 SCR MAMMO BI INCL CAD: CPT | Mod: 26

## 2021-04-29 PROCEDURE — 77067 SCR MAMMO BI INCL CAD: CPT

## 2021-04-29 PROCEDURE — 77063 BREAST TOMOSYNTHESIS BI: CPT | Mod: 26

## 2021-04-29 PROCEDURE — 77080 DXA BONE DENSITY AXIAL: CPT

## 2021-05-04 ENCOUNTER — RESULT REVIEW (OUTPATIENT)
Age: 56
End: 2021-05-04

## 2021-05-04 ENCOUNTER — OUTPATIENT (OUTPATIENT)
Dept: OUTPATIENT SERVICES | Facility: HOSPITAL | Age: 56
LOS: 1 days | End: 2021-05-04
Payer: MEDICAID

## 2021-05-04 ENCOUNTER — NON-APPOINTMENT (OUTPATIENT)
Age: 56
End: 2021-05-04

## 2021-05-04 ENCOUNTER — APPOINTMENT (OUTPATIENT)
Dept: ULTRASOUND IMAGING | Facility: IMAGING CENTER | Age: 56
End: 2021-05-04
Payer: MEDICAID

## 2021-05-04 DIAGNOSIS — Z00.8 ENCOUNTER FOR OTHER GENERAL EXAMINATION: ICD-10-CM

## 2021-05-04 PROCEDURE — 76642 ULTRASOUND BREAST LIMITED: CPT | Mod: 26,LT

## 2021-05-04 PROCEDURE — 76642 ULTRASOUND BREAST LIMITED: CPT

## 2021-05-10 ENCOUNTER — RX RENEWAL (OUTPATIENT)
Age: 56
End: 2021-05-10

## 2021-05-11 DIAGNOSIS — R92.8 OTHER ABNORMAL AND INCONCLUSIVE FINDINGS ON DIAGNOSTIC IMAGING OF BREAST: ICD-10-CM

## 2021-05-12 ENCOUNTER — OUTPATIENT (OUTPATIENT)
Dept: OUTPATIENT SERVICES | Facility: HOSPITAL | Age: 56
LOS: 1 days | End: 2021-05-12
Payer: MEDICAID

## 2021-05-12 ENCOUNTER — RESULT REVIEW (OUTPATIENT)
Age: 56
End: 2021-05-12

## 2021-05-12 ENCOUNTER — APPOINTMENT (OUTPATIENT)
Dept: ULTRASOUND IMAGING | Facility: IMAGING CENTER | Age: 56
End: 2021-05-12
Payer: MEDICAID

## 2021-05-12 DIAGNOSIS — Z00.8 ENCOUNTER FOR OTHER GENERAL EXAMINATION: ICD-10-CM

## 2021-05-12 PROCEDURE — 19083 BX BREAST 1ST LESION US IMAG: CPT

## 2021-05-12 PROCEDURE — 88305 TISSUE EXAM BY PATHOLOGIST: CPT

## 2021-05-12 PROCEDURE — A4648: CPT

## 2021-05-12 PROCEDURE — 19083 BX BREAST 1ST LESION US IMAG: CPT | Mod: LT

## 2021-05-12 PROCEDURE — 77065 DX MAMMO INCL CAD UNI: CPT

## 2021-05-12 PROCEDURE — 88305 TISSUE EXAM BY PATHOLOGIST: CPT | Mod: 26

## 2021-05-12 PROCEDURE — 77065 DX MAMMO INCL CAD UNI: CPT | Mod: 26,LT

## 2021-05-13 LAB — SURGICAL PATHOLOGY STUDY: SIGNIFICANT CHANGE UP

## 2021-09-21 ENCOUNTER — LABORATORY RESULT (OUTPATIENT)
Age: 56
End: 2021-09-21

## 2021-09-21 ENCOUNTER — APPOINTMENT (OUTPATIENT)
Dept: INTERNAL MEDICINE | Facility: CLINIC | Age: 56
End: 2021-09-21
Payer: MEDICAID

## 2021-09-21 ENCOUNTER — NON-APPOINTMENT (OUTPATIENT)
Age: 56
End: 2021-09-21

## 2021-09-21 VITALS
OXYGEN SATURATION: 99 % | WEIGHT: 131.71 LBS | HEIGHT: 63.11 IN | TEMPERATURE: 99 F | SYSTOLIC BLOOD PRESSURE: 95 MMHG | DIASTOLIC BLOOD PRESSURE: 63 MMHG | BODY MASS INDEX: 23.34 KG/M2 | HEART RATE: 64 BPM

## 2021-09-21 DIAGNOSIS — K21.9 GASTRO-ESOPHAGEAL REFLUX DISEASE W/OUT ESOPHAGITIS: ICD-10-CM

## 2021-09-21 DIAGNOSIS — M54.5 LOW BACK PAIN: ICD-10-CM

## 2021-09-21 PROCEDURE — 99214 OFFICE O/P EST MOD 30 MIN: CPT

## 2021-09-24 NOTE — PHYSICAL EXAM
[Normal] : affect was normal and insight and judgment were intact [de-identified] : No tenderness to palpation of the right lower back no tenderness over palpation over the femur or bursa internal rotation adequate, right lower quadrant discomfort

## 2021-09-24 NOTE — ASSESSMENT
[FreeTextEntry1] : Blood work done today\par Check thyroid function test\par GERD is stable\par Given lower back pain and pelvic pain to have x-rays of the lumbar spine hip and have pelvic ultrasound to assess for any ovarian abdominal pathology as well\par We will follow-up with neurology for cerebral aneurysm

## 2021-09-24 NOTE — HISTORY OF PRESENT ILLNESS
[FreeTextEntry1] : Patient presents for follow-up [de-identified] : Been having right hip pain and lower back pain\par Travels down the lateral side of the hip\par Denies any changes in urination bowel habits\par Has not seen gynecology\par Denies any worsening symptoms with movement\par Denies any nocturnal awakening\par Denies any change in bowel habits

## 2021-09-25 LAB
25(OH)D3 SERPL-MCNC: 43.9 NG/ML
ALBUMIN SERPL ELPH-MCNC: 4.6 G/DL
ALP BLD-CCNC: 74 U/L
ALT SERPL-CCNC: 11 U/L
ANION GAP SERPL CALC-SCNC: 11 MMOL/L
APPEARANCE: ABNORMAL
APPEARANCE: ABNORMAL
AST SERPL-CCNC: 13 U/L
BACTERIA: ABNORMAL
BASOPHILS # BLD AUTO: 0.06 K/UL
BASOPHILS NFR BLD AUTO: 0.7 %
BILIRUB SERPL-MCNC: 0.3 MG/DL
BILIRUBIN URINE: NEGATIVE
BILIRUBIN URINE: NEGATIVE
BLOOD URINE: ABNORMAL
BLOOD URINE: ABNORMAL
BUN SERPL-MCNC: 21 MG/DL
CALCIUM SERPL-MCNC: 9.8 MG/DL
CHLORIDE SERPL-SCNC: 103 MMOL/L
CHOLEST SERPL-MCNC: 230 MG/DL
CO2 SERPL-SCNC: 26 MMOL/L
COLOR: NORMAL
COLOR: NORMAL
CREAT SERPL-MCNC: 0.84 MG/DL
EOSINOPHIL # BLD AUTO: 0.18 K/UL
EOSINOPHIL NFR BLD AUTO: 2 %
ESTIMATED AVERAGE GLUCOSE: 114 MG/DL
GLUCOSE QUALITATIVE U: NEGATIVE
GLUCOSE QUALITATIVE U: NEGATIVE
GLUCOSE SERPL-MCNC: 92 MG/DL
HBA1C MFR BLD HPLC: 5.6 %
HCT VFR BLD CALC: 41.8 %
HDLC SERPL-MCNC: 81 MG/DL
HGB BLD-MCNC: 14 G/DL
HYALINE CASTS: 0 /LPF
IMM GRANULOCYTES NFR BLD AUTO: 0.3 %
KETONES URINE: NEGATIVE
KETONES URINE: NEGATIVE
LDLC SERPL CALC-MCNC: 134 MG/DL
LEUKOCYTE ESTERASE URINE: NEGATIVE
LEUKOCYTE ESTERASE URINE: NEGATIVE
LYMPHOCYTES # BLD AUTO: 2.66 K/UL
LYMPHOCYTES NFR BLD AUTO: 29 %
MAN DIFF?: NORMAL
MCHC RBC-ENTMCNC: 31.7 PG
MCHC RBC-ENTMCNC: 33.5 GM/DL
MCV RBC AUTO: 94.8 FL
MICROSCOPIC-UA: NORMAL
MONOCYTES # BLD AUTO: 0.5 K/UL
MONOCYTES NFR BLD AUTO: 5.5 %
NEUTROPHILS # BLD AUTO: 5.73 K/UL
NEUTROPHILS NFR BLD AUTO: 62.5 %
NITRITE URINE: NEGATIVE
NITRITE URINE: NEGATIVE
NONHDLC SERPL-MCNC: 149 MG/DL
PH URINE: 6
PH URINE: 6
PLATELET # BLD AUTO: 281 K/UL
POTASSIUM SERPL-SCNC: 4.6 MMOL/L
PROT SERPL-MCNC: 6.7 G/DL
PROTEIN URINE: NORMAL
PROTEIN URINE: NORMAL
RBC # BLD: 4.41 M/UL
RBC # FLD: 13 %
RED BLOOD CELLS URINE: 4 /HPF
SODIUM SERPL-SCNC: 140 MMOL/L
SPECIFIC GRAVITY URINE: 1.02
SPECIFIC GRAVITY URINE: 1.02
SQUAMOUS EPITHELIAL CELLS: 25 /HPF
TRIGL SERPL-MCNC: 74 MG/DL
TSH SERPL-ACNC: 1.72 UIU/ML
UROBILINOGEN URINE: NORMAL
UROBILINOGEN URINE: NORMAL
VIT B12 SERPL-MCNC: 880 PG/ML
WBC # FLD AUTO: 9.16 K/UL
WHITE BLOOD CELLS URINE: 10 /HPF

## 2021-10-19 ENCOUNTER — NON-APPOINTMENT (OUTPATIENT)
Age: 56
End: 2021-10-19

## 2021-10-19 NOTE — ED PROVIDER NOTE - CPE EDP SKIN NORM
Quality 110: Preventive Care And Screening: Influenza Immunization: Influenza Immunization Administered during Influenza season Detail Level: Generalized normal...

## 2021-11-23 ENCOUNTER — NON-APPOINTMENT (OUTPATIENT)
Age: 56
End: 2021-11-23

## 2021-12-03 ENCOUNTER — RX RENEWAL (OUTPATIENT)
Age: 56
End: 2021-12-03

## 2021-12-14 ENCOUNTER — APPOINTMENT (OUTPATIENT)
Dept: INTERNAL MEDICINE | Facility: CLINIC | Age: 56
End: 2021-12-14
Payer: MEDICAID

## 2021-12-14 VITALS
SYSTOLIC BLOOD PRESSURE: 110 MMHG | WEIGHT: 138.89 LBS | HEART RATE: 63 BPM | DIASTOLIC BLOOD PRESSURE: 72 MMHG | BODY MASS INDEX: 24.61 KG/M2 | TEMPERATURE: 97.9 F | OXYGEN SATURATION: 95 % | HEIGHT: 63.11 IN

## 2021-12-14 DIAGNOSIS — U07.1 COVID-19: ICD-10-CM

## 2021-12-14 PROCEDURE — 99213 OFFICE O/P EST LOW 20 MIN: CPT

## 2021-12-15 NOTE — HISTORY OF PRESENT ILLNESS
[FreeTextEntry1] : Patient presents to the office for follow-up after having Covid infection [de-identified] : And mild symptoms received monoclonal antibody\par Denies any cough fevers chills\par Denies any shortness of breath on exertion

## 2021-12-15 NOTE — ASSESSMENT
[FreeTextEntry1] : Blood work done today\par CT scan of the lungs ordered\par Check thyroid function test antithyroid antibodies\par Check inflammatory markers for history of the Covid infection

## 2021-12-17 LAB
ALBUMIN SERPL ELPH-MCNC: 4.7 G/DL
ALP BLD-CCNC: 81 U/L
ALT SERPL-CCNC: 16 U/L
ANION GAP SERPL CALC-SCNC: 11 MMOL/L
AST SERPL-CCNC: 15 U/L
BASOPHILS # BLD AUTO: 0.07 K/UL
BASOPHILS NFR BLD AUTO: 0.8 %
BILIRUB SERPL-MCNC: 0.3 MG/DL
BUN SERPL-MCNC: 18 MG/DL
CALCIUM SERPL-MCNC: 9.7 MG/DL
CHLORIDE SERPL-SCNC: 102 MMOL/L
CO2 SERPL-SCNC: 26 MMOL/L
CREAT SERPL-MCNC: 0.76 MG/DL
DEPRECATED D DIMER PPP IA-ACNC: <150 NG/ML DDU
EOSINOPHIL # BLD AUTO: 0.09 K/UL
EOSINOPHIL NFR BLD AUTO: 1 %
GLUCOSE SERPL-MCNC: 97 MG/DL
HCT VFR BLD CALC: 42.8 %
HGB BLD-MCNC: 13.9 G/DL
IMM GRANULOCYTES NFR BLD AUTO: 0.3 %
LYMPHOCYTES # BLD AUTO: 2.39 K/UL
LYMPHOCYTES NFR BLD AUTO: 26.3 %
MAN DIFF?: NORMAL
MCHC RBC-ENTMCNC: 32 PG
MCHC RBC-ENTMCNC: 32.5 GM/DL
MCV RBC AUTO: 98.6 FL
MONOCYTES # BLD AUTO: 0.57 K/UL
MONOCYTES NFR BLD AUTO: 6.3 %
NEUTROPHILS # BLD AUTO: 5.95 K/UL
NEUTROPHILS NFR BLD AUTO: 65.3 %
PLATELET # BLD AUTO: 293 K/UL
POTASSIUM SERPL-SCNC: 4.5 MMOL/L
PROT SERPL-MCNC: 6.6 G/DL
RBC # BLD: 4.34 M/UL
RBC # FLD: 13.2 %
SODIUM SERPL-SCNC: 139 MMOL/L
THYROGLOB AB SERPL-ACNC: <20 IU/ML
THYROPEROXIDASE AB SERPL IA-ACNC: <10 IU/ML
WBC # FLD AUTO: 9.1 K/UL

## 2021-12-24 ENCOUNTER — RESULT REVIEW (OUTPATIENT)
Age: 56
End: 2021-12-24

## 2021-12-24 ENCOUNTER — OUTPATIENT (OUTPATIENT)
Dept: OUTPATIENT SERVICES | Facility: HOSPITAL | Age: 56
LOS: 1 days | End: 2021-12-24
Payer: MEDICAID

## 2021-12-24 ENCOUNTER — APPOINTMENT (OUTPATIENT)
Dept: CT IMAGING | Facility: CLINIC | Age: 56
End: 2021-12-24
Payer: MEDICAID

## 2021-12-24 DIAGNOSIS — F17.210 NICOTINE DEPENDENCE, CIGARETTES, UNCOMPLICATED: ICD-10-CM

## 2021-12-24 DIAGNOSIS — Z00.8 ENCOUNTER FOR OTHER GENERAL EXAMINATION: ICD-10-CM

## 2021-12-24 PROCEDURE — 71271 CT THORAX LUNG CANCER SCR C-: CPT | Mod: 26

## 2021-12-24 PROCEDURE — 71271 CT THORAX LUNG CANCER SCR C-: CPT

## 2022-01-13 ENCOUNTER — APPOINTMENT (OUTPATIENT)
Dept: OBGYN | Facility: CLINIC | Age: 57
End: 2022-01-13
Payer: MEDICAID

## 2022-01-13 VITALS
DIASTOLIC BLOOD PRESSURE: 71 MMHG | HEIGHT: 63.11 IN | HEART RATE: 69 BPM | SYSTOLIC BLOOD PRESSURE: 107 MMHG | WEIGHT: 141 LBS | BODY MASS INDEX: 24.98 KG/M2

## 2022-01-13 DIAGNOSIS — R92.2 INCONCLUSIVE MAMMOGRAM: ICD-10-CM

## 2022-01-13 DIAGNOSIS — Z87.59 PERSONAL HISTORY OF OTHER COMPLICATIONS OF PREGNANCY, CHILDBIRTH AND THE PUERPERIUM: ICD-10-CM

## 2022-01-13 DIAGNOSIS — Z98.890 OTHER SPECIFIED POSTPROCEDURAL STATES: ICD-10-CM

## 2022-01-13 DIAGNOSIS — Z12.39 ENCOUNTER FOR OTHER SCREENING FOR MALIGNANT NEOPLASM OF BREAST: ICD-10-CM

## 2022-01-13 PROCEDURE — 99386 PREV VISIT NEW AGE 40-64: CPT

## 2022-01-13 RX ORDER — FLUTICASONE PROPIONATE 50 UG/1
50 SPRAY, METERED NASAL
Qty: 1 | Refills: 0 | Status: COMPLETED | COMMUNITY
Start: 2020-02-10 | End: 2022-01-13

## 2022-01-13 RX ORDER — MUPIROCIN 20 MG/G
2 OINTMENT TOPICAL 3 TIMES DAILY
Qty: 1 | Refills: 1 | Status: COMPLETED | COMMUNITY
Start: 2020-10-26 | End: 2022-01-13

## 2022-01-13 RX ORDER — DEXTRAN 70 AND HYPROMELLOSE 2910 1; 3 MG/ML; MG/ML
0.1-0.3 SOLUTION/ DROPS OPHTHALMIC EVERY 6 HOURS
Qty: 1 | Refills: 0 | Status: COMPLETED | COMMUNITY
Start: 2019-10-14 | End: 2022-01-13

## 2022-01-13 RX ORDER — DICLOFENAC SODIUM 1% 10 MG/G
1 GEL TOPICAL
Qty: 100 | Refills: 3 | Status: COMPLETED | COMMUNITY
Start: 2021-03-17 | End: 2022-01-13

## 2022-01-21 ENCOUNTER — NON-APPOINTMENT (OUTPATIENT)
Age: 57
End: 2022-01-21

## 2022-01-21 LAB
CYTOLOGY CVX/VAG DOC THIN PREP: ABNORMAL
HPV HIGH+LOW RISK DNA PNL CVX: DETECTED

## 2022-02-03 ENCOUNTER — APPOINTMENT (OUTPATIENT)
Dept: NEUROSURGERY | Facility: CLINIC | Age: 57
End: 2022-02-03
Payer: MEDICAID

## 2022-02-03 VITALS
HEIGHT: 63 IN | DIASTOLIC BLOOD PRESSURE: 69 MMHG | OXYGEN SATURATION: 97 % | BODY MASS INDEX: 24.98 KG/M2 | HEART RATE: 63 BPM | TEMPERATURE: 98.3 F | SYSTOLIC BLOOD PRESSURE: 104 MMHG | WEIGHT: 141 LBS

## 2022-02-03 PROCEDURE — 99203 OFFICE O/P NEW LOW 30 MIN: CPT

## 2022-02-03 NOTE — PHYSICAL EXAM
[General Appearance - Alert] : alert [General Appearance - In No Acute Distress] : in no acute distress [Person] : oriented to person [Place] : oriented to place [Time] : oriented to time [Cranial Nerves Oculomotor (III)] : extraocular motion intact [Cranial Nerves Trigeminal (V)] : facial sensation intact symmetrically [Cranial Nerves Facial (VII)] : face symmetrical [Cranial Nerves Vestibulocochlear (VIII)] : hearing was intact bilaterally [Cranial Nerves Glossopharyngeal (IX)] : tongue and palate midline [Cranial Nerves Accessory (XI - Cranial And Spinal)] : head turning and shoulder shrug symmetric [Cranial Nerves Hypoglossal (XII)] : there was no tongue deviation with protrusion [Motor Strength] : muscle strength was normal in all four extremities [] : no respiratory distress [Abnormal Walk] : normal gait

## 2022-02-04 NOTE — ASSESSMENT
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 \par -Dr. Alvarez referral for eye pain and drooping in the R eye \par -repeat MRA in 1 year

## 2022-02-04 NOTE — HISTORY OF PRESENT ILLNESS
[de-identified] : Rosa Elena Ospina is a 56yr old female here for a new patient visit.  She has past medical history of thyroid resection in 2005.  She was experiencing headaches and underwent imaging with mri mra brain had imaging 2020. Dr Sanabria is who she used to follow up with for about 4years. NO family history of aneurysm. Currently smokes cigarettes. Headache manageable. Denies any motor sensory speech visual abnormalities.

## 2022-03-06 ENCOUNTER — EMERGENCY (EMERGENCY)
Facility: HOSPITAL | Age: 57
LOS: 1 days | Discharge: HOME | End: 2022-03-06
Attending: EMERGENCY MEDICINE
Payer: MEDICAID

## 2022-03-06 VITALS
OXYGEN SATURATION: 98 % | HEART RATE: 66 BPM | DIASTOLIC BLOOD PRESSURE: 63 MMHG | SYSTOLIC BLOOD PRESSURE: 103 MMHG | RESPIRATION RATE: 16 BRPM | TEMPERATURE: 98 F

## 2022-03-06 VITALS
DIASTOLIC BLOOD PRESSURE: 78 MMHG | TEMPERATURE: 98 F | HEIGHT: 62 IN | HEART RATE: 70 BPM | RESPIRATION RATE: 20 BRPM | SYSTOLIC BLOOD PRESSURE: 124 MMHG | OXYGEN SATURATION: 98 % | WEIGHT: 141.1 LBS

## 2022-03-06 LAB
ALBUMIN SERPL ELPH-MCNC: 4.4 G/DL — SIGNIFICANT CHANGE UP (ref 3.3–5)
ALP SERPL-CCNC: 75 U/L — SIGNIFICANT CHANGE UP (ref 40–120)
ALT FLD-CCNC: 20 U/L — SIGNIFICANT CHANGE UP (ref 10–45)
ANION GAP SERPL CALC-SCNC: 11 MMOL/L — SIGNIFICANT CHANGE UP (ref 5–17)
APPEARANCE UR: CLEAR — SIGNIFICANT CHANGE UP
APTT BLD: 32.2 SEC — SIGNIFICANT CHANGE UP (ref 27.5–35.5)
AST SERPL-CCNC: 34 U/L — SIGNIFICANT CHANGE UP (ref 10–40)
BASOPHILS # BLD AUTO: 0.06 K/UL — SIGNIFICANT CHANGE UP (ref 0–0.2)
BASOPHILS NFR BLD AUTO: 0.9 % — SIGNIFICANT CHANGE UP (ref 0–2)
BILIRUB SERPL-MCNC: 0.3 MG/DL — SIGNIFICANT CHANGE UP (ref 0.2–1.2)
BILIRUB UR-MCNC: NEGATIVE — SIGNIFICANT CHANGE UP
BUN SERPL-MCNC: 14 MG/DL — SIGNIFICANT CHANGE UP (ref 7–23)
CALCIUM SERPL-MCNC: 9.4 MG/DL — SIGNIFICANT CHANGE UP (ref 8.4–10.5)
CHLORIDE SERPL-SCNC: 104 MMOL/L — SIGNIFICANT CHANGE UP (ref 96–108)
CO2 SERPL-SCNC: 25 MMOL/L — SIGNIFICANT CHANGE UP (ref 22–31)
COLOR SPEC: COLORLESS — SIGNIFICANT CHANGE UP
CREAT SERPL-MCNC: 0.62 MG/DL — SIGNIFICANT CHANGE UP (ref 0.5–1.3)
DIFF PNL FLD: NEGATIVE — SIGNIFICANT CHANGE UP
EGFR: 103 ML/MIN/1.73M2 — SIGNIFICANT CHANGE UP
EOSINOPHIL # BLD AUTO: 0.1 K/UL — SIGNIFICANT CHANGE UP (ref 0–0.5)
EOSINOPHIL NFR BLD AUTO: 1.4 % — SIGNIFICANT CHANGE UP (ref 0–6)
GLUCOSE SERPL-MCNC: 94 MG/DL — SIGNIFICANT CHANGE UP (ref 70–99)
GLUCOSE UR QL: NEGATIVE — SIGNIFICANT CHANGE UP
HCT VFR BLD CALC: 38.9 % — SIGNIFICANT CHANGE UP (ref 34.5–45)
HGB BLD-MCNC: 13.2 G/DL — SIGNIFICANT CHANGE UP (ref 11.5–15.5)
IMM GRANULOCYTES NFR BLD AUTO: 0.3 % — SIGNIFICANT CHANGE UP (ref 0–1.5)
INR BLD: 0.99 RATIO — SIGNIFICANT CHANGE UP (ref 0.88–1.16)
KETONES UR-MCNC: NEGATIVE — SIGNIFICANT CHANGE UP
LEUKOCYTE ESTERASE UR-ACNC: NEGATIVE — SIGNIFICANT CHANGE UP
LIDOCAIN IGE QN: 17 U/L — SIGNIFICANT CHANGE UP (ref 7–60)
LYMPHOCYTES # BLD AUTO: 2.33 K/UL — SIGNIFICANT CHANGE UP (ref 1–3.3)
LYMPHOCYTES # BLD AUTO: 33.1 % — SIGNIFICANT CHANGE UP (ref 13–44)
MCHC RBC-ENTMCNC: 32.4 PG — SIGNIFICANT CHANGE UP (ref 27–34)
MCHC RBC-ENTMCNC: 33.9 GM/DL — SIGNIFICANT CHANGE UP (ref 32–36)
MCV RBC AUTO: 95.3 FL — SIGNIFICANT CHANGE UP (ref 80–100)
MONOCYTES # BLD AUTO: 0.42 K/UL — SIGNIFICANT CHANGE UP (ref 0–0.9)
MONOCYTES NFR BLD AUTO: 6 % — SIGNIFICANT CHANGE UP (ref 2–14)
NEUTROPHILS # BLD AUTO: 4.11 K/UL — SIGNIFICANT CHANGE UP (ref 1.8–7.4)
NEUTROPHILS NFR BLD AUTO: 58.3 % — SIGNIFICANT CHANGE UP (ref 43–77)
NITRITE UR-MCNC: NEGATIVE — SIGNIFICANT CHANGE UP
NRBC # BLD: 0 /100 WBCS — SIGNIFICANT CHANGE UP (ref 0–0)
OB PNL STL: POSITIVE
PH UR: 6.5 — SIGNIFICANT CHANGE UP (ref 5–8)
PLATELET # BLD AUTO: 278 K/UL — SIGNIFICANT CHANGE UP (ref 150–400)
POTASSIUM SERPL-MCNC: 5 MMOL/L — SIGNIFICANT CHANGE UP (ref 3.5–5.3)
POTASSIUM SERPL-SCNC: 5 MMOL/L — SIGNIFICANT CHANGE UP (ref 3.5–5.3)
PROT SERPL-MCNC: 6.9 G/DL — SIGNIFICANT CHANGE UP (ref 6–8.3)
PROT UR-MCNC: NEGATIVE — SIGNIFICANT CHANGE UP
PROTHROM AB SERPL-ACNC: 11.4 SEC — SIGNIFICANT CHANGE UP (ref 10.5–13.4)
RBC # BLD: 4.08 M/UL — SIGNIFICANT CHANGE UP (ref 3.8–5.2)
RBC # FLD: 12.6 % — SIGNIFICANT CHANGE UP (ref 10.3–14.5)
SODIUM SERPL-SCNC: 140 MMOL/L — SIGNIFICANT CHANGE UP (ref 135–145)
SP GR SPEC: 1 — LOW (ref 1.01–1.02)
UROBILINOGEN FLD QL: NEGATIVE — SIGNIFICANT CHANGE UP
WBC # BLD: 7.04 K/UL — SIGNIFICANT CHANGE UP (ref 3.8–10.5)
WBC # FLD AUTO: 7.04 K/UL — SIGNIFICANT CHANGE UP (ref 3.8–10.5)

## 2022-03-06 PROCEDURE — 81003 URINALYSIS AUTO W/O SCOPE: CPT

## 2022-03-06 PROCEDURE — 83690 ASSAY OF LIPASE: CPT

## 2022-03-06 PROCEDURE — 85025 COMPLETE CBC W/AUTO DIFF WBC: CPT

## 2022-03-06 PROCEDURE — 82272 OCCULT BLD FECES 1-3 TESTS: CPT

## 2022-03-06 PROCEDURE — 85610 PROTHROMBIN TIME: CPT

## 2022-03-06 PROCEDURE — 80053 COMPREHEN METABOLIC PANEL: CPT

## 2022-03-06 PROCEDURE — 99285 EMERGENCY DEPT VISIT HI MDM: CPT

## 2022-03-06 PROCEDURE — 85730 THROMBOPLASTIN TIME PARTIAL: CPT

## 2022-03-06 PROCEDURE — 99284 EMERGENCY DEPT VISIT MOD MDM: CPT | Mod: 25

## 2022-03-06 PROCEDURE — 74177 CT ABD & PELVIS W/CONTRAST: CPT | Mod: MA

## 2022-03-06 PROCEDURE — 74177 CT ABD & PELVIS W/CONTRAST: CPT | Mod: 26,MA

## 2022-03-06 PROCEDURE — 36415 COLL VENOUS BLD VENIPUNCTURE: CPT

## 2022-03-06 PROCEDURE — 87086 URINE CULTURE/COLONY COUNT: CPT

## 2022-03-06 NOTE — ED PROVIDER NOTE - CLINICAL SUMMARY MEDICAL DECISION MAKING FREE TEXT BOX
55 yo F with hx of hypothyroidism presenting with painless rectal bleeding x 3 days. +crampy L sided abdominal pain. Nontoxic appearing, normal conjunctiva, normal rectal exam, +L sided abdominal ttp. DDx includes diverticulitis vs colitis vs internal hemorrhoid. Plan for labs, CTAP and dispo pending results.

## 2022-03-06 NOTE — ED PROVIDER NOTE - OBJECTIVE STATEMENT
55 yo F with hx of hypothyroidism presenting with rectal bleeding x 3 days. Reports normal formed light brown stools with bright red blood. Reports associated progressively worsening crampy abdomina pain mainly on the left side. Denies rectal pain, fevers, chills, n/v.

## 2022-03-06 NOTE — ED PROVIDER NOTE - NSFOLLOWUPINSTRUCTIONS_ED_ALL_ED_FT
You were evaluated in the Emergency Department for rectal bleeding and abdominal pain. Your CT showed findings concerning for possible inflammatory bowel disease.       There are no signs of emergency conditions requiring admission to the hospital on today's workup.      We recommend that you see your primary care physician within the next 3 days for follow up.  Bring a copy of your discharge paperwork (including any test results) to your doctor.    We recommend that you see your gastroenterologist within the next week for follow up.  Bring a copy of your discharge paperwork (including any test results) to your doctor.    ***Return to the emergency department if you have any other new/concerning symptoms, including but not limited to: worsening pain, fevers, vomiting, worsening bleeding***

## 2022-03-06 NOTE — ED PROVIDER NOTE - PATIENT PORTAL LINK FT
You can access the FollowMyHealth Patient Portal offered by [facility] [place] by registering at the following website: [email or web address]. By joining [employer]’s FollowMyHealth portal, you will also be able to view your health information using other applications (apps) compatible with our system.

## 2022-03-06 NOTE — ED PROVIDER NOTE - PROGRESS NOTE DETAILS
Jorge Alberto, PGY2: ct scan c/f IBD - patient had recent colonoscopy that she was told was normal. patient updated and will follow up with her GI doctor as outpatient. pain improved, feels comfortable going home.

## 2022-03-06 NOTE — ED PROVIDER NOTE - ATTENDING CONTRIBUTION TO CARE
Romain Hobson MD:   I personally saw the patient and performed a substantive portion of the visit including all aspects of the medical decision making.    MDM: 56F w/ Hx of hypothyroidism who p/w BRBPR and cramping abd pain. Will work-up for lower GI bleed. Stable vitals. No antiplatelets or anticoagulants to reverse. Will monitor closely for hemodynamic changes or active episodes of GI bleed in the ED. Will obtain CT scan of abd to evaluate for acute intraabdominal process. Romain Hobson MD:   I personally saw the patient and performed a substantive portion of the visit including all aspects of the medical decision making.    MDM: 56F w/ Hx of hypothyroidism who p/w BRBPR and cramping abd pain. Will work-up for lower GI bleed. Stable vitals. No antiplatelets or anticoagulants to reverse. Will monitor closely for hemodynamic changes or active episodes of GI bleed in the ED. Will obtain CT scan of abd to evaluate for acute intraabdominal process. Labs unremarkable, signed out to Dr. Whitt pending CT abd.

## 2022-03-06 NOTE — ED ADULT NURSE NOTE - OBJECTIVE STATEMENT
Pt is a 56 Y A&O x3 F with hx of cerebral aneurysm, athyroidism presenting to ED from home with c/o bloody stools "bright red" x 3 days. Pt also endorses lower abd pain a few days ago that is now resolved. Pt denies anti-coagulant use, N+V, diarrhea, fevers or chills. Pt arrives to ED breathing unlabored on RA. Pt speaking in complete sentences. Skin is warm and dry. No diaphoresis noted. Abd soft, non-tender, non-distended. Pt ambulatory with steady gait. Safety and comfort maintained. Awaiting MD evaluation. Call bell within reach.

## 2022-03-06 NOTE — ED PROVIDER NOTE - PHYSICAL EXAMINATION
Gen: NAD, AAOx3, non-toxic appearing  HEENT: NCAT, normal conjunctiva, oral mucosa moist  Lung: speaking in full sentences, good aeration bilaterally, lungs CTA b/l  CV: regular rate and rhythm. cap refill <2x. peripheral pulses 2+bilaterally   Abd: soft, ND, mild ttp in L abdomen, no rebound or guarding  MSK: no visible deformities  Neuro: No focal deficits  Skin: Intact  Psych: normal affect   Rectal: No internal/external hemorrhoids appreciated. No rectal fissures. Soft brown stool in the vault, no gross blood appreciated.

## 2022-03-07 LAB
CULTURE RESULTS: SIGNIFICANT CHANGE UP
SPECIMEN SOURCE: SIGNIFICANT CHANGE UP

## 2022-03-17 ENCOUNTER — APPOINTMENT (OUTPATIENT)
Dept: INTERNAL MEDICINE | Facility: CLINIC | Age: 57
End: 2022-03-17
Payer: MEDICAID

## 2022-03-17 VITALS
SYSTOLIC BLOOD PRESSURE: 100 MMHG | BODY MASS INDEX: 25.38 KG/M2 | HEIGHT: 62.99 IN | OXYGEN SATURATION: 98 % | HEART RATE: 62 BPM | TEMPERATURE: 97.4 F | WEIGHT: 143.22 LBS | DIASTOLIC BLOOD PRESSURE: 67 MMHG

## 2022-03-17 DIAGNOSIS — R22.1 LOCALIZED SWELLING, MASS AND LUMP, NECK: ICD-10-CM

## 2022-03-17 DIAGNOSIS — M54.2 CERVICALGIA: ICD-10-CM

## 2022-03-17 DIAGNOSIS — G89.29 CERVICALGIA: ICD-10-CM

## 2022-03-17 DIAGNOSIS — K62.5 HEMORRHAGE OF ANUS AND RECTUM: ICD-10-CM

## 2022-03-17 DIAGNOSIS — M54.9 CERVICALGIA: ICD-10-CM

## 2022-03-17 PROCEDURE — 99213 OFFICE O/P EST LOW 20 MIN: CPT | Mod: 25

## 2022-03-17 NOTE — PHYSICAL EXAM
[Normal Appearance] : normal in appearance [No Masses] : no palpable masses [No Axillary Lymphadenopathy] : no axillary lymphadenopathy [Normal] : normal gait, coordination grossly intact, no focal deficits and deep tendon reflexes were 2+ and symmetric [de-identified] : Possible neck nodule around the thyroid

## 2022-03-17 NOTE — HISTORY OF PRESENT ILLNESS
[FreeTextEntry1] : Patient presents to the office for follow-up [de-identified] : Is been having abdominal pain and bright red blood per rectum\par CT scan showed possible inflammatory bowel disease denies any joint pains rashes mouth ulcers\par Denies any sick contacts\par Denies any fevers chills night sweats\par Felt a nodule in the throat did have thyroid removed

## 2022-03-17 NOTE — ASSESSMENT
[FreeTextEntry1] : Blood work done today\par Ultrasound ordered for possible neck nodule\par Follow-up with gynecology and mammogram\par Follow-up with gastroenterology for CT findings

## 2022-03-22 LAB
25(OH)D3 SERPL-MCNC: 42.4 NG/ML
ALBUMIN SERPL ELPH-MCNC: 4.6 G/DL
ALP BLD-CCNC: 81 U/L
ALT SERPL-CCNC: 18 U/L
ANION GAP SERPL CALC-SCNC: 12 MMOL/L
APPEARANCE: CLEAR
APPEARANCE: CLEAR
AST SERPL-CCNC: 16 U/L
BACTERIA: NEGATIVE
BASOPHILS # BLD AUTO: 0.08 K/UL
BASOPHILS NFR BLD AUTO: 1.1 %
BILIRUB SERPL-MCNC: 0.2 MG/DL
BILIRUBIN URINE: NEGATIVE
BILIRUBIN URINE: NEGATIVE
BLOOD URINE: NEGATIVE
BLOOD URINE: NEGATIVE
BUN SERPL-MCNC: 17 MG/DL
CALCIUM SERPL-MCNC: 9.6 MG/DL
CHLORIDE SERPL-SCNC: 103 MMOL/L
CHOLEST SERPL-MCNC: 215 MG/DL
CO2 SERPL-SCNC: 26 MMOL/L
COLOR: COLORLESS
COLOR: COLORLESS
CREAT SERPL-MCNC: 0.71 MG/DL
EGFR: 98 ML/MIN/1.73M2
EOSINOPHIL # BLD AUTO: 0.13 K/UL
EOSINOPHIL NFR BLD AUTO: 1.8 %
ESTIMATED AVERAGE GLUCOSE: 114 MG/DL
GLUCOSE QUALITATIVE U: NEGATIVE
GLUCOSE QUALITATIVE U: NEGATIVE
GLUCOSE SERPL-MCNC: 87 MG/DL
HBA1C MFR BLD HPLC: 5.6 %
HCT VFR BLD CALC: 39.6 %
HDLC SERPL-MCNC: 86 MG/DL
HGB BLD-MCNC: 13.3 G/DL
HYALINE CASTS: 1 /LPF
IMM GRANULOCYTES NFR BLD AUTO: 0.4 %
KETONES URINE: NEGATIVE
KETONES URINE: NEGATIVE
LDLC SERPL CALC-MCNC: 120 MG/DL
LEUKOCYTE ESTERASE URINE: NEGATIVE
LEUKOCYTE ESTERASE URINE: NEGATIVE
LYMPHOCYTES # BLD AUTO: 2.84 K/UL
LYMPHOCYTES NFR BLD AUTO: 39.7 %
MAN DIFF?: NORMAL
MCHC RBC-ENTMCNC: 31.7 PG
MCHC RBC-ENTMCNC: 33.6 GM/DL
MCV RBC AUTO: 94.3 FL
MICROSCOPIC-UA: NORMAL
MONOCYTES # BLD AUTO: 0.46 K/UL
MONOCYTES NFR BLD AUTO: 6.4 %
NEUTROPHILS # BLD AUTO: 3.61 K/UL
NEUTROPHILS NFR BLD AUTO: 50.6 %
NITRITE URINE: NEGATIVE
NITRITE URINE: NEGATIVE
NONHDLC SERPL-MCNC: 128 MG/DL
PH URINE: 6
PH URINE: 6
PLATELET # BLD AUTO: 271 K/UL
POTASSIUM SERPL-SCNC: 4.6 MMOL/L
PROT SERPL-MCNC: 6.5 G/DL
PROTEIN URINE: NEGATIVE
PROTEIN URINE: NEGATIVE
RBC # BLD: 4.2 M/UL
RBC # FLD: 12.7 %
RED BLOOD CELLS URINE: 1 /HPF
SODIUM SERPL-SCNC: 142 MMOL/L
SPECIFIC GRAVITY URINE: 1.01
SPECIFIC GRAVITY URINE: 1.01
SQUAMOUS EPITHELIAL CELLS: 2 /HPF
TRIGL SERPL-MCNC: 42 MG/DL
TSH SERPL-ACNC: 1.39 UIU/ML
UROBILINOGEN URINE: NORMAL
UROBILINOGEN URINE: NORMAL
VIT B12 SERPL-MCNC: 1016 PG/ML
WBC # FLD AUTO: 7.15 K/UL
WHITE BLOOD CELLS URINE: 1 /HPF

## 2022-07-01 ENCOUNTER — EMERGENCY (EMERGENCY)
Facility: HOSPITAL | Age: 57
LOS: 1 days | Discharge: HOME | End: 2022-07-01
Attending: STUDENT IN AN ORGANIZED HEALTH CARE EDUCATION/TRAINING PROGRAM
Payer: MEDICAID

## 2022-07-01 VITALS
OXYGEN SATURATION: 98 % | HEART RATE: 60 BPM | RESPIRATION RATE: 20 BRPM | DIASTOLIC BLOOD PRESSURE: 64 MMHG | SYSTOLIC BLOOD PRESSURE: 94 MMHG

## 2022-07-01 VITALS
HEIGHT: 62 IN | WEIGHT: 136.69 LBS | TEMPERATURE: 98 F | HEART RATE: 58 BPM | DIASTOLIC BLOOD PRESSURE: 66 MMHG | OXYGEN SATURATION: 96 % | RESPIRATION RATE: 16 BRPM | SYSTOLIC BLOOD PRESSURE: 98 MMHG

## 2022-07-01 LAB
ALBUMIN SERPL ELPH-MCNC: 4.2 G/DL — SIGNIFICANT CHANGE UP (ref 3.3–5)
ALP SERPL-CCNC: 75 U/L — SIGNIFICANT CHANGE UP (ref 40–120)
ALT FLD-CCNC: 11 U/L — SIGNIFICANT CHANGE UP (ref 10–45)
ANION GAP SERPL CALC-SCNC: 9 MMOL/L — SIGNIFICANT CHANGE UP (ref 5–17)
APTT BLD: 33.2 SEC — SIGNIFICANT CHANGE UP (ref 27.5–35.5)
AST SERPL-CCNC: 13 U/L — SIGNIFICANT CHANGE UP (ref 10–40)
BASOPHILS # BLD AUTO: 0.07 K/UL — SIGNIFICANT CHANGE UP (ref 0–0.2)
BASOPHILS NFR BLD AUTO: 0.9 % — SIGNIFICANT CHANGE UP (ref 0–2)
BILIRUB SERPL-MCNC: 0.4 MG/DL — SIGNIFICANT CHANGE UP (ref 0.2–1.2)
BUN SERPL-MCNC: 12 MG/DL — SIGNIFICANT CHANGE UP (ref 7–23)
CALCIUM SERPL-MCNC: 9.5 MG/DL — SIGNIFICANT CHANGE UP (ref 8.4–10.5)
CHLORIDE SERPL-SCNC: 104 MMOL/L — SIGNIFICANT CHANGE UP (ref 96–108)
CO2 SERPL-SCNC: 27 MMOL/L — SIGNIFICANT CHANGE UP (ref 22–31)
CREAT SERPL-MCNC: 0.75 MG/DL — SIGNIFICANT CHANGE UP (ref 0.5–1.3)
D DIMER BLD IA.RAPID-MCNC: <150 NG/ML DDU — SIGNIFICANT CHANGE UP
EGFR: 92 ML/MIN/1.73M2 — SIGNIFICANT CHANGE UP
EOSINOPHIL # BLD AUTO: 0.13 K/UL — SIGNIFICANT CHANGE UP (ref 0–0.5)
EOSINOPHIL NFR BLD AUTO: 1.7 % — SIGNIFICANT CHANGE UP (ref 0–6)
GLUCOSE SERPL-MCNC: 97 MG/DL — SIGNIFICANT CHANGE UP (ref 70–99)
HCT VFR BLD CALC: 38.5 % — SIGNIFICANT CHANGE UP (ref 34.5–45)
HGB BLD-MCNC: 13 G/DL — SIGNIFICANT CHANGE UP (ref 11.5–15.5)
IMM GRANULOCYTES NFR BLD AUTO: 0.4 % — SIGNIFICANT CHANGE UP (ref 0–1.5)
INR BLD: 1.04 RATIO — SIGNIFICANT CHANGE UP (ref 0.88–1.16)
LYMPHOCYTES # BLD AUTO: 2.35 K/UL — SIGNIFICANT CHANGE UP (ref 1–3.3)
LYMPHOCYTES # BLD AUTO: 30 % — SIGNIFICANT CHANGE UP (ref 13–44)
MAGNESIUM SERPL-MCNC: 1.9 MG/DL — SIGNIFICANT CHANGE UP (ref 1.6–2.6)
MCHC RBC-ENTMCNC: 31.6 PG — SIGNIFICANT CHANGE UP (ref 27–34)
MCHC RBC-ENTMCNC: 33.8 GM/DL — SIGNIFICANT CHANGE UP (ref 32–36)
MCV RBC AUTO: 93.7 FL — SIGNIFICANT CHANGE UP (ref 80–100)
MONOCYTES # BLD AUTO: 0.51 K/UL — SIGNIFICANT CHANGE UP (ref 0–0.9)
MONOCYTES NFR BLD AUTO: 6.5 % — SIGNIFICANT CHANGE UP (ref 2–14)
NEUTROPHILS # BLD AUTO: 4.75 K/UL — SIGNIFICANT CHANGE UP (ref 1.8–7.4)
NEUTROPHILS NFR BLD AUTO: 60.5 % — SIGNIFICANT CHANGE UP (ref 43–77)
NRBC # BLD: 0 /100 WBCS — SIGNIFICANT CHANGE UP (ref 0–0)
PLATELET # BLD AUTO: 255 K/UL — SIGNIFICANT CHANGE UP (ref 150–400)
POTASSIUM SERPL-MCNC: 4.2 MMOL/L — SIGNIFICANT CHANGE UP (ref 3.5–5.3)
POTASSIUM SERPL-SCNC: 4.2 MMOL/L — SIGNIFICANT CHANGE UP (ref 3.5–5.3)
PROT SERPL-MCNC: 6.3 G/DL — SIGNIFICANT CHANGE UP (ref 6–8.3)
PROTHROM AB SERPL-ACNC: 12.1 SEC — SIGNIFICANT CHANGE UP (ref 10.5–13.4)
RBC # BLD: 4.11 M/UL — SIGNIFICANT CHANGE UP (ref 3.8–5.2)
RBC # FLD: 12.5 % — SIGNIFICANT CHANGE UP (ref 10.3–14.5)
SODIUM SERPL-SCNC: 140 MMOL/L — SIGNIFICANT CHANGE UP (ref 135–145)
TROPONIN T, HIGH SENSITIVITY RESULT: <6 NG/L — SIGNIFICANT CHANGE UP (ref 0–51)
TROPONIN T, HIGH SENSITIVITY RESULT: <6 NG/L — SIGNIFICANT CHANGE UP (ref 0–51)
WBC # BLD: 7.84 K/UL — SIGNIFICANT CHANGE UP (ref 3.8–10.5)
WBC # FLD AUTO: 7.84 K/UL — SIGNIFICANT CHANGE UP (ref 3.8–10.5)

## 2022-07-01 PROCEDURE — 84484 ASSAY OF TROPONIN QUANT: CPT

## 2022-07-01 PROCEDURE — 71046 X-RAY EXAM CHEST 2 VIEWS: CPT | Mod: 26

## 2022-07-01 PROCEDURE — 80053 COMPREHEN METABOLIC PANEL: CPT

## 2022-07-01 PROCEDURE — 99285 EMERGENCY DEPT VISIT HI MDM: CPT | Mod: 25

## 2022-07-01 PROCEDURE — 85610 PROTHROMBIN TIME: CPT

## 2022-07-01 PROCEDURE — 83735 ASSAY OF MAGNESIUM: CPT

## 2022-07-01 PROCEDURE — 93005 ELECTROCARDIOGRAM TRACING: CPT

## 2022-07-01 PROCEDURE — 85730 THROMBOPLASTIN TIME PARTIAL: CPT

## 2022-07-01 PROCEDURE — 85379 FIBRIN DEGRADATION QUANT: CPT

## 2022-07-01 PROCEDURE — 71046 X-RAY EXAM CHEST 2 VIEWS: CPT

## 2022-07-01 PROCEDURE — 99285 EMERGENCY DEPT VISIT HI MDM: CPT

## 2022-07-01 PROCEDURE — 85025 COMPLETE CBC W/AUTO DIFF WBC: CPT

## 2022-07-01 PROCEDURE — 93010 ELECTROCARDIOGRAM REPORT: CPT

## 2022-07-01 RX ORDER — ACETAMINOPHEN 500 MG
1000 TABLET ORAL ONCE
Refills: 0 | Status: COMPLETED | OUTPATIENT
Start: 2022-07-01 | End: 2022-07-01

## 2022-07-01 RX ADMIN — Medication 400 MILLIGRAM(S): at 11:25

## 2022-07-01 NOTE — ED PROVIDER NOTE - PATIENT PORTAL LINK FT
You can access the FollowMyHealth Patient Portal offered by [facility] by registering at the following website: [email or web address]. By joining [employer]’s FollowMyHealth portal, you will also be able to view your health information using other applications (apps) compatible with our system.

## 2022-07-01 NOTE — ED PROVIDER NOTE - PHYSICAL EXAMINATION
GENERAL: no acute distress, non-toxic appearing  HEAD: normocephalic, atraumatic  HEENT: normal conjunctiva, oral mucosa moist, neck supple  CARDIAC: regular rate and rhythm, normal S1 and S2,  no appreciable murmurs  PULM: clear to ascultation bilaterally, no crackles, rales, rhonchi, or wheezing  GI: abdomen nondistended, soft, nontender, no guarding or rebound tenderness  : no CVA tenderness, no suprapubic tenderness  NEURO: alert and oriented x 3, normal speech, PERRLA, EOMI, no focal motor or sensory deficits  MSK: no visible deformities, no peripheral edema, calf tenderness/redness/swelling  SKIN: no visible rashes, dry, well-perfused  PSYCH: appropriate mood and affect Moderate carbohydrate, cardiac

## 2022-07-01 NOTE — ED PROVIDER NOTE - NSFOLLOWUPINSTRUCTIONS_ED_ALL_ED_FT
-You were seen in the Emergency Department (ED) for chest pain. Lab and imaging results, if performed, were discussed with you along with your discharge diagnosis.    FOLLOW-UP:  -Please follow up with your PMD if symptoms return or for any concerning matter pertaining to your chest pain.  -Please follow up with your private physician within the next 72 hours. Tell them you were recently in the ED for an urgent issue and would like to be seen. Bring copies of your results if you were given.   -If you do not have a PMD, please call [phone] to find one convenient for you or call our clinic at [phone].    MEDICATIONS:  -Continue all other prescribed medicine, IF ANY, as per your primary care doctor's (PMD) recommendations.    PAIN CONTROL:  -Please take over the counter Tylenol (also known as acetaminophen) 650mg every 6 hours or Ibuprofen (also known as motrin, advil) 600mg every 8 hour for your pain, IF ANY, unless you are not supposed to for any reason.  -Rest, stay hydrated with plenty of fluids (drink at least 2 Liters or 64 Ounces of water each day UNLESS you are supposed to restrict fluids or ANY reason.    RETURN PRECAUTIONS:  -Please return to the Emergency Department if you experience ANY new or concerning symptoms, such as, but not limited to: worsening pain, large amount of bleeding, passing out, fever >100.F, shaking chills, inability to see or new double vision, chest pain, difficulty breathing, diffuse abdominal pain, unable to eat or drink, continuous vomiting or diarrhea, unable to move or feel part of your body

## 2022-07-01 NOTE — ED PROVIDER NOTE - PROGRESS NOTE DETAILS
Attending (Miles Flores D.O.):  Labs nonactionable. Pending 3 hr trop. If neg, stable for DC and close outpatient f/u.

## 2022-07-01 NOTE — ED PROVIDER NOTE - NS ED ATTENDING STATEMENT MOD
I have seen and examined this patient and fully participated in the care of this patient as the teaching attending.  The service was shared with the ROSANNE.  I reviewed and verified the documentation and independently performed the documented:

## 2022-07-01 NOTE — ED PROVIDER NOTE - ATTENDING CONTRIBUTION TO CARE
Attending (Miles Flores D.O.):  I have personally seen and examined this patient. I have performed a substantive portion of the visit including all aspects of the medical decision making. Resident and/or ACP note reviewed. I agree on the plan of care except where noted.     56F hx of hypothyroidism 2/2 total thyroidectomy, smoker here for reproducible L rib 2-3 rib. Not exertional. Slight pleuritic. Benign abdomen. Well appearing. Hemodynamically stable. No audible mumurs. Neurovasc intact. Low suspicion for ACS or PE. No signs of acute lung path such as PNA. Check labs, cardiac biomarkers,, EKG, CXR, place on cardiac monitor for telemetry monitoring.

## 2022-07-01 NOTE — ED ADULT NURSE NOTE - CHPI ED NUR SYMPTOMS NEG
no chills/no diaphoresis/no dizziness/no fever/no nausea/no shortness of breath/no syncope/no vomiting

## 2022-07-01 NOTE — ED PROVIDER NOTE - OBJECTIVE STATEMENT
57 yo F with a pmhx of hypothyroidism presents to the ED with chest pain that started earlier today. She was at home, washing dishing before experiencing sudden onset chest pain over the L side. She describes the pain as sharp, worse with palpation and movements. No hx of trauma or falls. Pain does not radiate anywhere else. no worsening AMBROSE. Usual state of health prior to today. She denies any abdominal pain, SOB. fevers, chills, N/V/D, diaphoresis. Denies any other symptoms at bedside.

## 2022-07-01 NOTE — ED ADULT NURSE NOTE - CAS EDN DISCHARGE ASSESSMENT
denies dizziness or lightheadedness/Alert and oriented to person, place and time/Awake/Symptoms improved

## 2022-07-01 NOTE — ED ADULT NURSE NOTE - OBJECTIVE STATEMENT
Female 56 years old alert and orientedx4 brought in by EMS for chest pain. Female 56 years old alert and orientedx4 brought in by EMS for chest pain. Pt reports she was washing pumpkin flowers after picking them up from th garden and suddenly  felt pain at side of her left breast radiating to her chest and back associated with shortness of breathe. Denies sweating, dizziness, N/V. Denies fever or chills, numbness or tingling. Denies pain at rest, reports pressure pain 6/10 on movement. Safety and comfort maintained. Call bell within easy reach. Will continue to monitor.

## 2022-07-01 NOTE — ED PROVIDER NOTE - CLINICAL SUMMARY MEDICAL DECISION MAKING FREE TEXT BOX
57 yo F with a pmhx of hypothyroidism presents to the ED with chest pain that started earlier today. vitals non actionable. PE as noted above. the differential diagnoses includes, but not limited to: acs vs bronchospasm vs msk related pain. reproducible pain on palpation. will order labs, imaging, ekg, meds, reassess

## 2022-07-05 ENCOUNTER — APPOINTMENT (OUTPATIENT)
Dept: INTERNAL MEDICINE | Facility: CLINIC | Age: 57
End: 2022-07-05

## 2022-07-05 VITALS
SYSTOLIC BLOOD PRESSURE: 98 MMHG | HEART RATE: 70 BPM | HEIGHT: 62.2 IN | OXYGEN SATURATION: 98 % | TEMPERATURE: 98.5 F | WEIGHT: 139.42 LBS | BODY MASS INDEX: 25.33 KG/M2 | DIASTOLIC BLOOD PRESSURE: 62 MMHG

## 2022-07-05 DIAGNOSIS — R07.9 CHEST PAIN, UNSPECIFIED: ICD-10-CM

## 2022-07-05 PROCEDURE — 99213 OFFICE O/P EST LOW 20 MIN: CPT

## 2022-07-05 NOTE — ASSESSMENT
[FreeTextEntry1] : Most likely muscular etiology medical records from the emergency reviewed advised Tylenol NSAIDs heating pads\par Given the pressure-like sensation also advised follow-up with cardiology however suspect less likely patient does have some risk factors

## 2022-07-05 NOTE — PHYSICAL EXAM
[Normal] : no carotid or abdominal bruits heard, no varicosities, pedal pulses are present, no peripheral edema, no extremity clubbing or cyanosis and no palpable aorta [de-identified] : Left-sided back pain, reproducible chest chest tenderness on the left side left anterior chest wall

## 2022-07-05 NOTE — HISTORY OF PRESENT ILLNESS
[FreeTextEntry1] : Patient presents for follow-up from ER visit [de-identified] : Patient was seen in the hospital had sudden onset chest pain started from the back radiate to the front lasted for few hours\par For like a rock a pressure-like sensation in the chest\par Denies any lightheadedness dizziness diaphoresis radiating symptoms and denies any cough rashes shortness of breath on exertion\par Symptoms have alleviated with Tylenol\par

## 2022-08-27 ENCOUNTER — APPOINTMENT (OUTPATIENT)
Dept: ULTRASOUND IMAGING | Facility: IMAGING CENTER | Age: 57
End: 2022-08-27

## 2022-08-27 ENCOUNTER — OUTPATIENT (OUTPATIENT)
Dept: OUTPATIENT SERVICES | Facility: HOSPITAL | Age: 57
LOS: 1 days | End: 2022-08-27
Payer: MEDICAID

## 2022-08-27 ENCOUNTER — APPOINTMENT (OUTPATIENT)
Dept: MAMMOGRAPHY | Facility: IMAGING CENTER | Age: 57
End: 2022-08-27

## 2022-08-27 ENCOUNTER — RESULT REVIEW (OUTPATIENT)
Age: 57
End: 2022-08-27

## 2022-08-27 DIAGNOSIS — R92.2 INCONCLUSIVE MAMMOGRAM: ICD-10-CM

## 2022-08-27 DIAGNOSIS — Z00.8 ENCOUNTER FOR OTHER GENERAL EXAMINATION: ICD-10-CM

## 2022-08-27 DIAGNOSIS — Z12.39 ENCOUNTER FOR OTHER SCREENING FOR MALIGNANT NEOPLASM OF BREAST: ICD-10-CM

## 2022-08-27 PROCEDURE — 77067 SCR MAMMO BI INCL CAD: CPT | Mod: 26

## 2022-08-27 PROCEDURE — 76641 ULTRASOUND BREAST COMPLETE: CPT | Mod: 26,50

## 2022-08-27 PROCEDURE — 77063 BREAST TOMOSYNTHESIS BI: CPT

## 2022-08-27 PROCEDURE — 76641 ULTRASOUND BREAST COMPLETE: CPT

## 2022-08-27 PROCEDURE — 77067 SCR MAMMO BI INCL CAD: CPT

## 2022-08-27 PROCEDURE — 77063 BREAST TOMOSYNTHESIS BI: CPT | Mod: 26

## 2022-09-01 ENCOUNTER — APPOINTMENT (OUTPATIENT)
Dept: INTERNAL MEDICINE | Facility: CLINIC | Age: 57
End: 2022-09-01

## 2022-09-01 VITALS
HEIGHT: 62.99 IN | WEIGHT: 136.68 LBS | HEART RATE: 67 BPM | BODY MASS INDEX: 24.22 KG/M2 | TEMPERATURE: 94 F | OXYGEN SATURATION: 98 % | SYSTOLIC BLOOD PRESSURE: 113 MMHG | DIASTOLIC BLOOD PRESSURE: 70 MMHG

## 2022-09-01 PROCEDURE — 36415 COLL VENOUS BLD VENIPUNCTURE: CPT

## 2022-09-01 PROCEDURE — 99213 OFFICE O/P EST LOW 20 MIN: CPT | Mod: 25

## 2022-09-01 NOTE — ASSESSMENT
[FreeTextEntry1] : Annual Physical Exam\par -Blood work done today\par -Check A1c, lipid panel and Vitamin levels.\par -EKG done today with no abnormalities.\par -BP is stable. Continue current management.\par -Name of neurosurgeon provided for further evaluation and recommendation given cerebral aneurysm.\par -Continue with lifestyle modification and healthy diet.\par -RTO annually or as needed.

## 2022-09-01 NOTE — HISTORY OF PRESENT ILLNESS
[FreeTextEntry1] : Patient presents for annual physical exam. [de-identified] : Patient is doing well overall and has not gotten sick since last visit.\par Pt has no acute concerns. \par She continues to watch her diet closely.\par Denies any CP, Chest tightness, or SOB.\par Denies any abdominal pain, urinary symptom or change in bowel habits.\par Denies any fever, night sweats, or chills.\par Denies any nausea, vomiting, dizziness, or lightheadedness.

## 2022-09-01 NOTE — ADDENDUM
[FreeTextEntry1] : I, Christiana Gonzalez, acted as a scribe on behalf of Dr. Donnell Melton MD, on 09/01/2022. \par \par All medical entries made by the scribe were at my, Dr. Donnell Melton MD, direction and personally dictated by me on 09/01/2022. I have reviewed the chart and agree that the record accurately reflects my personal performance of the history, physical exam, assessment and plan. I have also personally directed, reviewed, and agreed with the chart.

## 2022-09-07 LAB
25(OH)D3 SERPL-MCNC: 39.1 NG/ML
ALBUMIN SERPL ELPH-MCNC: 4.5 G/DL
ALP BLD-CCNC: 74 U/L
ALT SERPL-CCNC: 15 U/L
ANION GAP SERPL CALC-SCNC: 11 MMOL/L
APPEARANCE: CLEAR
AST SERPL-CCNC: 17 U/L
BACTERIA: NEGATIVE
BASOPHILS # BLD AUTO: 0.06 K/UL
BASOPHILS NFR BLD AUTO: 0.7 %
BILIRUB SERPL-MCNC: 0.2 MG/DL
BILIRUBIN URINE: NEGATIVE
BLOOD URINE: NEGATIVE
BUN SERPL-MCNC: 12 MG/DL
CALCIUM SERPL-MCNC: 9.8 MG/DL
CHLORIDE SERPL-SCNC: 103 MMOL/L
CHOLEST SERPL-MCNC: 220 MG/DL
CO2 SERPL-SCNC: 26 MMOL/L
COLOR: NORMAL
CREAT SERPL-MCNC: 0.76 MG/DL
EGFR: 91 ML/MIN/1.73M2
EOSINOPHIL # BLD AUTO: 0.1 K/UL
EOSINOPHIL NFR BLD AUTO: 1.1 %
ESTIMATED AVERAGE GLUCOSE: 114 MG/DL
GLUCOSE QUALITATIVE U: NEGATIVE
GLUCOSE SERPL-MCNC: 87 MG/DL
HBA1C MFR BLD HPLC: 5.6 %
HCT VFR BLD CALC: 40 %
HDLC SERPL-MCNC: 81 MG/DL
HGB BLD-MCNC: 13.2 G/DL
HYALINE CASTS: 2 /LPF
IMM GRANULOCYTES NFR BLD AUTO: 0.3 %
KETONES URINE: NEGATIVE
LDLC SERPL CALC-MCNC: 123 MG/DL
LEUKOCYTE ESTERASE URINE: NEGATIVE
LYMPHOCYTES # BLD AUTO: 3.05 K/UL
LYMPHOCYTES NFR BLD AUTO: 33.7 %
MAN DIFF?: NORMAL
MCHC RBC-ENTMCNC: 31.1 PG
MCHC RBC-ENTMCNC: 33 GM/DL
MCV RBC AUTO: 94.1 FL
MICROSCOPIC-UA: NORMAL
MONOCYTES # BLD AUTO: 0.51 K/UL
MONOCYTES NFR BLD AUTO: 5.6 %
NEUTROPHILS # BLD AUTO: 5.3 K/UL
NEUTROPHILS NFR BLD AUTO: 58.6 %
NITRITE URINE: NEGATIVE
NONHDLC SERPL-MCNC: 139 MG/DL
PH URINE: 6
PLATELET # BLD AUTO: 291 K/UL
POTASSIUM SERPL-SCNC: 4.4 MMOL/L
PROT SERPL-MCNC: 6.6 G/DL
PROTEIN URINE: NEGATIVE
RBC # BLD: 4.25 M/UL
RBC # FLD: 12.9 %
RED BLOOD CELLS URINE: 2 /HPF
SODIUM SERPL-SCNC: 140 MMOL/L
SPECIFIC GRAVITY URINE: 1.01
SQUAMOUS EPITHELIAL CELLS: 2 /HPF
TRIGL SERPL-MCNC: 81 MG/DL
TSH SERPL-ACNC: 0.56 UIU/ML
UROBILINOGEN URINE: NORMAL
VIT B12 SERPL-MCNC: 800 PG/ML
WBC # FLD AUTO: 9.05 K/UL
WHITE BLOOD CELLS URINE: 0 /HPF

## 2022-10-11 ENCOUNTER — RX RENEWAL (OUTPATIENT)
Age: 57
End: 2022-10-11

## 2022-12-06 ENCOUNTER — RX RENEWAL (OUTPATIENT)
Age: 57
End: 2022-12-06

## 2022-12-15 ENCOUNTER — APPOINTMENT (OUTPATIENT)
Dept: NEUROSURGERY | Facility: CLINIC | Age: 57
End: 2022-12-15

## 2022-12-29 ENCOUNTER — APPOINTMENT (OUTPATIENT)
Dept: NEUROSURGERY | Facility: CLINIC | Age: 57
End: 2022-12-29

## 2023-01-21 ENCOUNTER — OUTPATIENT (OUTPATIENT)
Dept: OUTPATIENT SERVICES | Facility: HOSPITAL | Age: 58
LOS: 1 days | End: 2023-01-21
Payer: MEDICAID

## 2023-01-21 ENCOUNTER — APPOINTMENT (OUTPATIENT)
Dept: MRI IMAGING | Facility: CLINIC | Age: 58
End: 2023-01-21
Payer: MEDICAID

## 2023-01-21 DIAGNOSIS — I67.1 CEREBRAL ANEURYSM, NONRUPTURED: ICD-10-CM

## 2023-01-21 DIAGNOSIS — Z00.8 ENCOUNTER FOR OTHER GENERAL EXAMINATION: ICD-10-CM

## 2023-01-21 PROCEDURE — 70544 MR ANGIOGRAPHY HEAD W/O DYE: CPT

## 2023-01-21 PROCEDURE — 70544 MR ANGIOGRAPHY HEAD W/O DYE: CPT | Mod: 26

## 2023-01-26 ENCOUNTER — NON-APPOINTMENT (OUTPATIENT)
Age: 58
End: 2023-01-26

## 2023-01-26 ENCOUNTER — APPOINTMENT (OUTPATIENT)
Dept: NEUROSURGERY | Facility: CLINIC | Age: 58
End: 2023-01-26
Payer: MEDICAID

## 2023-01-26 VITALS
DIASTOLIC BLOOD PRESSURE: 66 MMHG | OXYGEN SATURATION: 97 % | SYSTOLIC BLOOD PRESSURE: 95 MMHG | BODY MASS INDEX: 25.03 KG/M2 | HEART RATE: 77 BPM | WEIGHT: 136 LBS | HEIGHT: 62 IN

## 2023-01-26 PROCEDURE — 99212 OFFICE O/P EST SF 10 MIN: CPT

## 2023-01-26 NOTE — RESULTS/DATA
[FreeTextEntry1] : EXAM: [id number] - MR ANGIO BRAIN - ORDERED BY: MITESH REYES\par \par \par PROCEDURE DATE: 01/21/2023\par \par \par \par INTERPRETATION: CLINICAL STATEMENT: Known right internal carotid artery aneurysm\par \par TECHNIQUE: MRA of the head was performed without gadolinium. 3-D MIP images were obtained.\par \par COMPARISON: None.\par \par FINDINGS:\par The proximal anterior, middle and posterior cerebral arteries are patent without hemodynamically significant stenosis. Hypoplastic A1 segment left anterior cerebral artery noted\par \par The intracranial vertebral and basilar arteries are patent. Dominant left vertebral artery noted\par \par There is a 4 x 2 mm aneurysm pointing medially at the right carotid siphon\par \par Additional 1 mm aneurysm noted at the right MCA bifurcation 2-95\par \par IMPRESSION:\par Intracranial aneurysms as described above. Comparison with prior exam recommended if available\par \par --- End of Report ---\par \par \par \par \par \par \par LYNDSAY CHAPA MD; Attending Radiologist\par This document has been electronically signed. Jan 21 2023 7:11PM

## 2023-02-21 ENCOUNTER — APPOINTMENT (OUTPATIENT)
Dept: OBGYN | Facility: CLINIC | Age: 58
End: 2023-02-21
Payer: MEDICAID

## 2023-02-21 VITALS
BODY MASS INDEX: 25.03 KG/M2 | HEIGHT: 62 IN | WEIGHT: 136 LBS | SYSTOLIC BLOOD PRESSURE: 101 MMHG | DIASTOLIC BLOOD PRESSURE: 64 MMHG | HEART RATE: 67 BPM

## 2023-02-21 DIAGNOSIS — Z01.419 ENCOUNTER FOR GYNECOLOGICAL EXAMINATION (GENERAL) (ROUTINE) W/OUT ABNORMAL FINDINGS: ICD-10-CM

## 2023-02-21 PROCEDURE — 99396 PREV VISIT EST AGE 40-64: CPT

## 2023-02-21 RX ORDER — LIDOCAINE AND PRILOCAINE 25; 25 MG/G; MG/G
2.5-2.5 CREAM TOPICAL
Qty: 30 | Refills: 0 | Status: COMPLETED | COMMUNITY
Start: 2021-01-19 | End: 2023-02-21

## 2023-02-21 RX ORDER — OMEPRAZOLE 40 MG/1
40 CAPSULE, DELAYED RELEASE ORAL
Qty: 90 | Refills: 3 | Status: COMPLETED | COMMUNITY
Start: 2020-04-06 | End: 2023-02-21

## 2023-02-21 NOTE — HISTORY OF PRESENT ILLNESS
[Mammogramdate] : 2022 [BreastSonogramDate] : 2022 [PapSmeardate] : 2022 [BoneDensityDate] : 2021 [ColonoscopyDate] : 2022

## 2023-02-22 LAB — HPV HIGH+LOW RISK DNA PNL CVX: NOT DETECTED

## 2023-02-24 LAB — CYTOLOGY CVX/VAG DOC THIN PREP: NORMAL

## 2023-03-02 ENCOUNTER — APPOINTMENT (OUTPATIENT)
Dept: INTERNAL MEDICINE | Facility: CLINIC | Age: 58
End: 2023-03-02
Payer: MEDICAID

## 2023-03-02 VITALS
TEMPERATURE: 98.1 F | BODY MASS INDEX: 26.31 KG/M2 | DIASTOLIC BLOOD PRESSURE: 63 MMHG | HEART RATE: 71 BPM | OXYGEN SATURATION: 98 % | HEIGHT: 62 IN | SYSTOLIC BLOOD PRESSURE: 95 MMHG | WEIGHT: 143 LBS

## 2023-03-02 DIAGNOSIS — E78.5 HYPERLIPIDEMIA, UNSPECIFIED: ICD-10-CM

## 2023-03-02 DIAGNOSIS — R10.2 PELVIC AND PERINEAL PAIN: ICD-10-CM

## 2023-03-02 DIAGNOSIS — F17.210 NICOTINE DEPENDENCE, CIGARETTES, UNCOMPLICATED: ICD-10-CM

## 2023-03-02 PROCEDURE — 99214 OFFICE O/P EST MOD 30 MIN: CPT | Mod: 25

## 2023-03-02 NOTE — HISTORY OF PRESENT ILLNESS
[FreeTextEntry1] : Patient presents for follow-up.  [de-identified] : Patient c/o back pain, radiated down to the front in the pelvic region\par Denies any issues with urination or passing stool.\par Denies any CP, chest tightness or SOB.\par \par Pt did have diarrhea few weeks ago which has since improved. UTD with colonoscopy.\par

## 2023-03-02 NOTE — ADDENDUM
[FreeTextEntry1] : I, Christiana Gonzalez, acted as a scribe on behalf of Dr. Donnell Melton MD, on 03/02/2023. \par \par All medical entries made by the scribe were at my, Dr. Donnell Melton MD, direction and personally dictated by me on 03/02/2023. I have reviewed the chart and agree that the record accurately reflects my personal performance of the history, physical exam, assessment and plan. I have also personally directed, reviewed, and agreed with the chart.

## 2023-03-02 NOTE — ASSESSMENT
[FreeTextEntry1] : -Blood work done today.\par -BP is stable. Continue current management.\par -Check A1c, lipid panel and vitamin levels.\par -Pelvic pain possibly secondary to Back related complaints.\par -Abdominal pelvic US ordered.\par -Low dose CT Scan ordered.\par -RTO in 6 months\par

## 2023-03-07 LAB
25(OH)D3 SERPL-MCNC: 36 NG/ML
ALBUMIN SERPL ELPH-MCNC: 4.4 G/DL
ALP BLD-CCNC: 91 U/L
ALT SERPL-CCNC: 18 U/L
ANION GAP SERPL CALC-SCNC: 13 MMOL/L
APPEARANCE: CLEAR
AST SERPL-CCNC: 17 U/L
BACTERIA: NEGATIVE
BASOPHILS # BLD AUTO: 0.05 K/UL
BASOPHILS NFR BLD AUTO: 0.7 %
BILIRUB SERPL-MCNC: 0.3 MG/DL
BILIRUBIN URINE: NEGATIVE
BLOOD URINE: NEGATIVE
BUN SERPL-MCNC: 14 MG/DL
CALCIUM SERPL-MCNC: 9.6 MG/DL
CHLORIDE SERPL-SCNC: 104 MMOL/L
CHOLEST SERPL-MCNC: 202 MG/DL
CO2 SERPL-SCNC: 24 MMOL/L
COLOR: COLORLESS
CREAT SERPL-MCNC: 0.71 MG/DL
EGFR: 98 ML/MIN/1.73M2
EOSINOPHIL # BLD AUTO: 0.13 K/UL
EOSINOPHIL NFR BLD AUTO: 1.9 %
ESTIMATED AVERAGE GLUCOSE: 117 MG/DL
GLUCOSE QUALITATIVE U: NEGATIVE
GLUCOSE SERPL-MCNC: 93 MG/DL
HBA1C MFR BLD HPLC: 5.7 %
HCT VFR BLD CALC: 41.1 %
HDLC SERPL-MCNC: 78 MG/DL
HGB BLD-MCNC: 13.4 G/DL
HYALINE CASTS: 1 /LPF
IMM GRANULOCYTES NFR BLD AUTO: 0.3 %
KETONES URINE: NEGATIVE
LDLC SERPL CALC-MCNC: 113 MG/DL
LEUKOCYTE ESTERASE URINE: NEGATIVE
LYMPHOCYTES # BLD AUTO: 2.53 K/UL
LYMPHOCYTES NFR BLD AUTO: 37.4 %
MAN DIFF?: NORMAL
MCHC RBC-ENTMCNC: 31.3 PG
MCHC RBC-ENTMCNC: 32.6 GM/DL
MCV RBC AUTO: 96 FL
MICROSCOPIC-UA: NORMAL
MONOCYTES # BLD AUTO: 0.49 K/UL
MONOCYTES NFR BLD AUTO: 7.2 %
NEUTROPHILS # BLD AUTO: 3.54 K/UL
NEUTROPHILS NFR BLD AUTO: 52.5 %
NITRITE URINE: NEGATIVE
NONHDLC SERPL-MCNC: 124 MG/DL
PH URINE: 6.5
PLATELET # BLD AUTO: 282 K/UL
POTASSIUM SERPL-SCNC: 4.4 MMOL/L
PROT SERPL-MCNC: 6.4 G/DL
PROTEIN URINE: NEGATIVE
RBC # BLD: 4.28 M/UL
RBC # FLD: 13.2 %
RED BLOOD CELLS URINE: 1 /HPF
SODIUM SERPL-SCNC: 141 MMOL/L
SPECIFIC GRAVITY URINE: 1.01
SQUAMOUS EPITHELIAL CELLS: 2 /HPF
TRIGL SERPL-MCNC: 55 MG/DL
TSH SERPL-ACNC: 2.49 UIU/ML
UROBILINOGEN URINE: NORMAL
VIT B12 SERPL-MCNC: 862 PG/ML
WBC # FLD AUTO: 6.76 K/UL
WHITE BLOOD CELLS URINE: 0 /HPF

## 2023-03-08 NOTE — ED PROVIDER NOTE - NSICDXPASTMEDICALHX_GEN_ALL_CORE_FT
PAST MEDICAL HISTORY:  Athyroidism (acquired)      Cosentyx Counseling:  I discussed with the patient the risks of Cosentyx including but not limited to worsening of Crohn's disease, immunosuppression, allergic reactions and infections.  The patient understands that monitoring is required including a PPD at baseline and must alert us or the primary physician if symptoms of infection or other concerning signs are noted.

## 2023-03-15 VITALS — BODY MASS INDEX: 26.31 KG/M2 | HEIGHT: 62 IN | WEIGHT: 143 LBS

## 2023-03-15 NOTE — REASON FOR VISIT
[Home] : at home, [unfilled] , at the time of the visit. [Medical Office: ([facility])___] : at the medical office located in  [Verbal consent obtained from patient] : the patient, [unfilled] [Annual Follow-Up] : an annual follow-up visit [Review of Eligibility] : review of eligibility [Low-Dose CT Screening Discussion] : low-dose CT lung cancer screening discussion [Virtual Visit] : virtual visit

## 2023-03-15 NOTE — HISTORY OF PRESENT ILLNESS
[Current] : Current [TextBox_13] : Referred by Dr. Melton\par \par Ms. ARTIS HADLEY is a 57 year old woman with a history of nicotine dependence\par \par  Over the telephone today we reviewed and confirmed that the patient meets screening eligibility criteria:\par \par -Age: 57 years old\par \par Smoking status:\par \par -Current smoker\par \par -Number of years smoked: 1 x 39, .5 x 5\par \par -Number of pack years smokin\par \par \par Ms. HADLEY denies any personal history of lung cancer. Denies any s/s of lung cancer. No lung cancer in a 1st degree relative. Denies any history of lung disease. Denies any history of occupational exposures\par  [PacksperYear] : 41

## 2023-03-16 ENCOUNTER — OUTPATIENT (OUTPATIENT)
Dept: OUTPATIENT SERVICES | Facility: HOSPITAL | Age: 58
LOS: 1 days | End: 2023-03-16
Payer: MEDICAID

## 2023-03-16 ENCOUNTER — APPOINTMENT (OUTPATIENT)
Dept: ULTRASOUND IMAGING | Facility: CLINIC | Age: 58
End: 2023-03-16
Payer: MEDICAID

## 2023-03-16 ENCOUNTER — APPOINTMENT (OUTPATIENT)
Dept: CT IMAGING | Facility: CLINIC | Age: 58
End: 2023-03-16
Payer: MEDICAID

## 2023-03-16 DIAGNOSIS — R10.2 PELVIC AND PERINEAL PAIN: ICD-10-CM

## 2023-03-16 DIAGNOSIS — F17.210 NICOTINE DEPENDENCE, CIGARETTES, UNCOMPLICATED: ICD-10-CM

## 2023-03-16 PROCEDURE — 76700 US EXAM ABDOM COMPLETE: CPT | Mod: 26

## 2023-03-16 PROCEDURE — 76700 US EXAM ABDOM COMPLETE: CPT

## 2023-03-16 PROCEDURE — 71271 CT THORAX LUNG CANCER SCR C-: CPT

## 2023-03-16 PROCEDURE — 76830 TRANSVAGINAL US NON-OB: CPT | Mod: 26

## 2023-03-16 PROCEDURE — 76830 TRANSVAGINAL US NON-OB: CPT

## 2023-03-16 PROCEDURE — 71271 CT THORAX LUNG CANCER SCR C-: CPT | Mod: 26

## 2023-03-29 ENCOUNTER — TRANSCRIPTION ENCOUNTER (OUTPATIENT)
Age: 58
End: 2023-03-29

## 2023-07-19 ENCOUNTER — APPOINTMENT (OUTPATIENT)
Dept: OBGYN | Facility: CLINIC | Age: 58
End: 2023-07-19

## 2023-07-27 ENCOUNTER — APPOINTMENT (OUTPATIENT)
Dept: RADIOLOGY | Facility: IMAGING CENTER | Age: 58
End: 2023-07-27

## 2023-09-22 ENCOUNTER — APPOINTMENT (OUTPATIENT)
Dept: INTERNAL MEDICINE | Facility: CLINIC | Age: 58
End: 2023-09-22
Payer: MEDICAID

## 2023-09-22 VITALS
OXYGEN SATURATION: 99 % | TEMPERATURE: 97.6 F | HEIGHT: 62 IN | BODY MASS INDEX: 25.56 KG/M2 | WEIGHT: 138.89 LBS | SYSTOLIC BLOOD PRESSURE: 110 MMHG | DIASTOLIC BLOOD PRESSURE: 73 MMHG | HEART RATE: 67 BPM

## 2023-09-22 DIAGNOSIS — Z00.00 ENCOUNTER FOR GENERAL ADULT MEDICAL EXAMINATION W/OUT ABNORMAL FINDINGS: ICD-10-CM

## 2023-09-22 DIAGNOSIS — E03.9 HYPOTHYROIDISM, UNSPECIFIED: ICD-10-CM

## 2023-09-22 PROCEDURE — 99396 PREV VISIT EST AGE 40-64: CPT | Mod: 25

## 2023-09-22 PROCEDURE — 36415 COLL VENOUS BLD VENIPUNCTURE: CPT

## 2023-09-29 LAB
25(OH)D3 SERPL-MCNC: 36.9 NG/ML
ALBUMIN SERPL ELPH-MCNC: 4.7 G/DL
ALP BLD-CCNC: 93 U/L
ALT SERPL-CCNC: 15 U/L
ANION GAP SERPL CALC-SCNC: 12 MMOL/L
APPEARANCE: CLEAR
AST SERPL-CCNC: 18 U/L
BASOPHILS # BLD AUTO: 0.04 K/UL
BASOPHILS NFR BLD AUTO: 0.7 %
BILIRUB SERPL-MCNC: 0.3 MG/DL
BILIRUBIN URINE: NEGATIVE
BLOOD URINE: NEGATIVE
BUN SERPL-MCNC: 15 MG/DL
CALCIUM SERPL-MCNC: 10 MG/DL
CHLORIDE SERPL-SCNC: 102 MMOL/L
CHOLEST SERPL-MCNC: 190 MG/DL
CO2 SERPL-SCNC: 27 MMOL/L
COLOR: YELLOW
CREAT SERPL-MCNC: 0.85 MG/DL
EGFR: 79 ML/MIN/1.73M2
EOSINOPHIL # BLD AUTO: 0.09 K/UL
EOSINOPHIL NFR BLD AUTO: 1.5 %
ESTIMATED AVERAGE GLUCOSE: 120 MG/DL
GLUCOSE QUALITATIVE U: NEGATIVE MG/DL
GLUCOSE SERPL-MCNC: 100 MG/DL
HBA1C MFR BLD HPLC: 5.8 %
HCT VFR BLD CALC: 41.9 %
HDLC SERPL-MCNC: 100 MG/DL
HGB BLD-MCNC: 13.6 G/DL
IMM GRANULOCYTES NFR BLD AUTO: 0.3 %
KETONES URINE: NEGATIVE MG/DL
LDLC SERPL CALC-MCNC: 81 MG/DL
LEUKOCYTE ESTERASE URINE: NEGATIVE
LYMPHOCYTES # BLD AUTO: 2.41 K/UL
LYMPHOCYTES NFR BLD AUTO: 41.4 %
MAGNESIUM SERPL-MCNC: 2 MG/DL
MAN DIFF?: NORMAL
MCHC RBC-ENTMCNC: 31.1 PG
MCHC RBC-ENTMCNC: 32.5 GM/DL
MCV RBC AUTO: 95.7 FL
MONOCYTES # BLD AUTO: 0.46 K/UL
MONOCYTES NFR BLD AUTO: 7.9 %
NEUTROPHILS # BLD AUTO: 2.8 K/UL
NEUTROPHILS NFR BLD AUTO: 48.2 %
NITRITE URINE: NEGATIVE
NONHDLC SERPL-MCNC: 90 MG/DL
PH URINE: 7
PHOSPHATE SERPL-MCNC: 3.6 MG/DL
PLATELET # BLD AUTO: 296 K/UL
POTASSIUM SERPL-SCNC: 5.4 MMOL/L
PROT SERPL-MCNC: 6.9 G/DL
PROTEIN URINE: NEGATIVE MG/DL
RBC # BLD: 4.38 M/UL
RBC # FLD: 13.1 %
SODIUM SERPL-SCNC: 141 MMOL/L
SPECIFIC GRAVITY URINE: 1.01
TRIGL SERPL-MCNC: 48 MG/DL
TSH SERPL-ACNC: 2.67 UIU/ML
UROBILINOGEN URINE: 0.2 MG/DL
VIT B12 SERPL-MCNC: 943 PG/ML
WBC # FLD AUTO: 5.82 K/UL

## 2023-10-13 ENCOUNTER — APPOINTMENT (OUTPATIENT)
Dept: NEUROSURGERY | Facility: CLINIC | Age: 58
End: 2023-10-13
Payer: MEDICAID

## 2023-10-13 ENCOUNTER — NON-APPOINTMENT (OUTPATIENT)
Age: 58
End: 2023-10-13

## 2023-10-13 VITALS
HEIGHT: 62 IN | WEIGHT: 138.89 LBS | HEART RATE: 58 BPM | SYSTOLIC BLOOD PRESSURE: 110 MMHG | DIASTOLIC BLOOD PRESSURE: 75 MMHG | BODY MASS INDEX: 25.56 KG/M2 | OXYGEN SATURATION: 95 %

## 2023-10-13 DIAGNOSIS — Z87.891 PERSONAL HISTORY OF NICOTINE DEPENDENCE: ICD-10-CM

## 2023-10-13 DIAGNOSIS — F17.200 NICOTINE DEPENDENCE, UNSPECIFIED, UNCOMPLICATED: ICD-10-CM

## 2023-10-13 DIAGNOSIS — I67.1 CEREBRAL ANEURYSM, NONRUPTURED: ICD-10-CM

## 2023-10-13 DIAGNOSIS — Z56.0 UNEMPLOYMENT, UNSPECIFIED: ICD-10-CM

## 2023-10-13 PROCEDURE — 99215 OFFICE O/P EST HI 40 MIN: CPT

## 2023-10-13 SDOH — ECONOMIC STABILITY - INCOME SECURITY: UNEMPLOYMENT, UNSPECIFIED: Z56.0

## 2023-10-16 PROBLEM — I67.1 CEREBRAL ANEURYSM: Status: ACTIVE | Noted: 2020-01-28

## 2023-10-16 PROBLEM — F17.200 CURRENT EVERY DAY SMOKER: Status: ACTIVE | Noted: 2018-10-15

## 2023-10-16 PROBLEM — Z87.891 FORMER SMOKER: Status: ACTIVE | Noted: 2023-10-16

## 2023-10-16 PROBLEM — Z56.0 UNEMPLOYED: Status: ACTIVE | Noted: 2023-10-16

## 2023-11-15 ENCOUNTER — NON-APPOINTMENT (OUTPATIENT)
Age: 58
End: 2023-11-15

## 2023-12-04 ENCOUNTER — RX RENEWAL (OUTPATIENT)
Age: 58
End: 2023-12-04

## 2023-12-04 RX ORDER — LEVOTHYROXINE SODIUM 0.1 MG/1
100 TABLET ORAL
Qty: 30 | Refills: 11 | Status: ACTIVE | COMMUNITY
Start: 2018-10-15 | End: 1900-01-01

## 2024-02-22 ENCOUNTER — APPOINTMENT (OUTPATIENT)
Dept: OBGYN | Facility: CLINIC | Age: 59
End: 2024-02-22

## 2024-06-19 NOTE — ED PROVIDER NOTE - NSDCPRINTRESULTS_ED_ALL_ED
[FreeTextEntry1] : I, Dana Fernandez, acted as a scribe on behalf of Dr. Dustin Palma MD, on 06/19/2024.   All medical entries made by the scribe were at my, Dr. Dustin Palma MD, direction and personally dictated by me on 06/19/2024. I have reviewed the chart and agree that the record accurately reflects my personal performance of the history, physical exam, assessment and plan. I have also personally directed, reviewed, and agreed with the chart. 
Patient requests all Lab and Radiology Results on their Discharge Instructions

## 2024-10-26 ENCOUNTER — OUTPATIENT (OUTPATIENT)
Dept: OUTPATIENT SERVICES | Facility: HOSPITAL | Age: 59
LOS: 1 days | End: 2024-10-26
Payer: MEDICAID

## 2024-10-26 ENCOUNTER — APPOINTMENT (OUTPATIENT)
Dept: CT IMAGING | Facility: CLINIC | Age: 59
End: 2024-10-26
Payer: MEDICAID

## 2024-10-26 DIAGNOSIS — I67.1 CEREBRAL ANEURYSM, NONRUPTURED: ICD-10-CM

## 2024-10-26 PROCEDURE — 70496 CT ANGIOGRAPHY HEAD: CPT | Mod: 26

## 2024-10-26 PROCEDURE — 70496 CT ANGIOGRAPHY HEAD: CPT

## 2024-10-29 ENCOUNTER — APPOINTMENT (OUTPATIENT)
Dept: NEUROSURGERY | Facility: CLINIC | Age: 59
End: 2024-10-29
Payer: MEDICAID

## 2024-10-29 ENCOUNTER — NON-APPOINTMENT (OUTPATIENT)
Age: 59
End: 2024-10-29

## 2024-10-29 DIAGNOSIS — I67.1 CEREBRAL ANEURYSM, NONRUPTURED: ICD-10-CM

## 2024-10-29 PROCEDURE — 99215 OFFICE O/P EST HI 40 MIN: CPT

## 2024-11-11 ENCOUNTER — LABORATORY RESULT (OUTPATIENT)
Age: 59
End: 2024-11-11

## 2024-11-11 ENCOUNTER — APPOINTMENT (OUTPATIENT)
Dept: INTERNAL MEDICINE | Facility: CLINIC | Age: 59
End: 2024-11-11
Payer: MEDICAID

## 2024-11-11 VITALS
TEMPERATURE: 98.2 F | WEIGHT: 139 LBS | DIASTOLIC BLOOD PRESSURE: 69 MMHG | HEIGHT: 62 IN | SYSTOLIC BLOOD PRESSURE: 106 MMHG | OXYGEN SATURATION: 98 % | HEART RATE: 73 BPM | BODY MASS INDEX: 25.58 KG/M2

## 2024-11-11 DIAGNOSIS — M54.2 CERVICALGIA: ICD-10-CM

## 2024-11-11 DIAGNOSIS — M54.6 PAIN IN THORACIC SPINE: ICD-10-CM

## 2024-11-11 DIAGNOSIS — Z00.00 ENCOUNTER FOR GENERAL ADULT MEDICAL EXAMINATION W/OUT ABNORMAL FINDINGS: ICD-10-CM

## 2024-11-11 PROCEDURE — 99396 PREV VISIT EST AGE 40-64: CPT

## 2024-11-11 PROCEDURE — 36415 COLL VENOUS BLD VENIPUNCTURE: CPT

## 2024-11-11 PROCEDURE — G0296 VISIT TO DETERM LDCT ELIG: CPT

## 2024-11-11 RX ORDER — LIDOCAINE AND PRILOCAINE 25; 25 MG/G; MG/G
2.5-2.5 CREAM TOPICAL
Qty: 30 | Refills: 0 | Status: ACTIVE | COMMUNITY
Start: 2024-11-11 | End: 1900-01-01

## 2024-11-15 LAB
25(OH)D3 SERPL-MCNC: 37.2 NG/ML
ALBUMIN SERPL ELPH-MCNC: 4.4 G/DL
ALP BLD-CCNC: 104 U/L
ALT SERPL-CCNC: 16 U/L
ANION GAP SERPL CALC-SCNC: 12 MMOL/L
APPEARANCE: CLEAR
AST SERPL-CCNC: 16 U/L
BASOPHILS # BLD AUTO: 0.06 K/UL
BASOPHILS NFR BLD AUTO: 0.9 %
BILIRUB SERPL-MCNC: 0.3 MG/DL
BILIRUBIN URINE: NEGATIVE
BLOOD URINE: NEGATIVE
BUN SERPL-MCNC: 17 MG/DL
CALCIUM SERPL-MCNC: 9.5 MG/DL
CHLORIDE SERPL-SCNC: 102 MMOL/L
CHOLEST SERPL-MCNC: 210 MG/DL
CO2 SERPL-SCNC: 25 MMOL/L
COLOR: YELLOW
CREAT SERPL-MCNC: 0.77 MG/DL
EGFR: 88 ML/MIN/1.73M2
EOSINOPHIL # BLD AUTO: 0.16 K/UL
EOSINOPHIL NFR BLD AUTO: 2.5 %
ESTIMATED AVERAGE GLUCOSE: 120 MG/DL
GLUCOSE QUALITATIVE U: NEGATIVE MG/DL
GLUCOSE SERPL-MCNC: 94 MG/DL
HBA1C MFR BLD HPLC: 5.8 %
HCT VFR BLD CALC: 39.4 %
HDLC SERPL-MCNC: 79 MG/DL
HGB BLD-MCNC: 13.3 G/DL
IMM GRANULOCYTES NFR BLD AUTO: 0.3 %
KETONES URINE: NEGATIVE MG/DL
LDLC SERPL CALC-MCNC: 119 MG/DL
LEUKOCYTE ESTERASE URINE: ABNORMAL
LYMPHOCYTES # BLD AUTO: 2.05 K/UL
LYMPHOCYTES NFR BLD AUTO: 31.4 %
MAN DIFF?: NORMAL
MCHC RBC-ENTMCNC: 31.7 PG
MCHC RBC-ENTMCNC: 33.8 G/DL
MCV RBC AUTO: 93.8 FL
MONOCYTES # BLD AUTO: 0.44 K/UL
MONOCYTES NFR BLD AUTO: 6.7 %
NEUTROPHILS # BLD AUTO: 3.8 K/UL
NEUTROPHILS NFR BLD AUTO: 58.2 %
NITRITE URINE: NEGATIVE
NONHDLC SERPL-MCNC: 130 MG/DL
PH URINE: 7
PLATELET # BLD AUTO: 294 K/UL
POTASSIUM SERPL-SCNC: 4.5 MMOL/L
PROT SERPL-MCNC: 6.5 G/DL
PROTEIN URINE: NEGATIVE MG/DL
RBC # BLD: 4.2 M/UL
RBC # FLD: 13.6 %
SODIUM SERPL-SCNC: 139 MMOL/L
SPECIFIC GRAVITY URINE: 1.01
TRIGL SERPL-MCNC: 64 MG/DL
TSH SERPL-ACNC: 6.41 UIU/ML
UROBILINOGEN URINE: 0.2 MG/DL
VIT B12 SERPL-MCNC: 761 PG/ML
WBC # FLD AUTO: 6.53 K/UL

## 2024-11-21 ENCOUNTER — NON-APPOINTMENT (OUTPATIENT)
Age: 59
End: 2024-11-21

## 2024-11-21 VITALS — HEIGHT: 62 IN | WEIGHT: 139 LBS | BODY MASS INDEX: 25.58 KG/M2

## 2024-11-21 DIAGNOSIS — F17.210 NICOTINE DEPENDENCE, CIGARETTES, UNCOMPLICATED: ICD-10-CM

## 2024-11-23 ENCOUNTER — OUTPATIENT (OUTPATIENT)
Dept: OUTPATIENT SERVICES | Facility: HOSPITAL | Age: 59
LOS: 1 days | End: 2024-11-23
Payer: MEDICAID

## 2024-11-23 ENCOUNTER — APPOINTMENT (OUTPATIENT)
Dept: CT IMAGING | Facility: CLINIC | Age: 59
End: 2024-11-23

## 2024-11-23 DIAGNOSIS — Z00.8 ENCOUNTER FOR OTHER GENERAL EXAMINATION: ICD-10-CM

## 2024-11-23 DIAGNOSIS — Z00.00 ENCOUNTER FOR GENERAL ADULT MEDICAL EXAMINATION WITHOUT ABNORMAL FINDINGS: ICD-10-CM

## 2024-11-23 PROCEDURE — 71271 CT THORAX LUNG CANCER SCR C-: CPT | Mod: 26

## 2024-11-23 PROCEDURE — 71271 CT THORAX LUNG CANCER SCR C-: CPT

## 2024-11-30 ENCOUNTER — APPOINTMENT (OUTPATIENT)
Dept: ULTRASOUND IMAGING | Facility: CLINIC | Age: 59
End: 2024-11-30
Payer: MEDICAID

## 2024-11-30 ENCOUNTER — APPOINTMENT (OUTPATIENT)
Dept: MAMMOGRAPHY | Facility: CLINIC | Age: 59
End: 2024-11-30
Payer: MEDICAID

## 2024-11-30 ENCOUNTER — RESULT REVIEW (OUTPATIENT)
Age: 59
End: 2024-11-30

## 2024-11-30 PROCEDURE — 77067 SCR MAMMO BI INCL CAD: CPT

## 2024-11-30 PROCEDURE — 76641 ULTRASOUND BREAST COMPLETE: CPT | Mod: 50

## 2024-11-30 PROCEDURE — 77063 BREAST TOMOSYNTHESIS BI: CPT

## 2024-12-09 ENCOUNTER — APPOINTMENT (OUTPATIENT)
Dept: INTERNAL MEDICINE | Facility: CLINIC | Age: 59
End: 2024-12-09
Payer: MEDICAID

## 2024-12-09 DIAGNOSIS — E03.9 HYPOTHYROIDISM, UNSPECIFIED: ICD-10-CM

## 2024-12-09 PROCEDURE — 36415 COLL VENOUS BLD VENIPUNCTURE: CPT

## 2024-12-16 LAB
T4 FREE SERPL-MCNC: 1.6 NG/DL
TSH SERPL-ACNC: 1.69 UIU/ML

## 2025-01-18 ENCOUNTER — TRANSCRIPTION ENCOUNTER (OUTPATIENT)
Age: 60
End: 2025-01-18

## 2025-03-10 NOTE — ED ADULT NURSE NOTE - CHPI ED NUR SEVERITY2
Outpatient Adult Speech Language Therapy           Treatment Note    Patient: Nivia Cagle                                                                                     Visit Date: 3/10/2025  :     1954    Referring practitioner:    Catalina Madsen PA-C  Date of Initial Visit:          Type: THERAPY  Episode: Aspiration into airway, irregular esophago-gastric mucosal j    Patient seen for 3 sessions    Visit Diagnoses:    ICD-10-CM ICD-9-CM   1. Oropharyngeal dysphagia  R13.12 787.22     SUBJECTIVE   Patient is here today to increase tongue base strength and swallow coordination to decrease risk of aspiration before and after the swallow reducing risk of aspiration.         Education:POC, HEP, signs and symptoms of aspiration      OBJECTIVE   GOALS           GOALS ACTIVITY PERFORMED TRIALS COMPLETED LEVEL OF CUEING REQUIRED   LTG 1: 8 weeks:  The patient will demonstrate 0% swallow limitation by achieving a score of  7/7 on the Functional Communication Measures for swallowing to increase safety of swallow to highest levels of functioning using compensatory strategies to reduce risk of aspiration.             STG 1a: 8 weeks Patient will accept 80% trials of thin liquids on  2 consecutive trials with min to no clinical signs and symptoms of aspiration to facilitate a safe swallow. Thin liquids by 5 ml  Spoon    Trials of cup drink water 10/10      10/10 Min assist   STG 1b: 8 weeks: Patient will complete a snack and/or meal  using compensatory strategies with min assist and minimal clinical signs and symptoms of aspiration  to  facilitate a safe swallow. Applesauce 5 ml spoon    Toast with cinnamon and sweetener       10/10    8/10  Min to mod assist   STG 1c: 8 weeks:  Patient will be educated on signs and symptoms of aspiration and diet with patient verbalizing understanding with min cueing. POC  Signs and symptoms of  aspiration, GERD management, HEP   Mod assist to teach and answer questions    STG 2a: 8 weeks:  Patient will complete tongue base isometrics to increase tongue base strength to a 5/5 reducing  residual after the swallow, and increasing timeliness of swallow.  Yawn technique 3 sets of 10 repetitions Mod assist   STG2b: 8 weeks:Patient will complete coordination exercises with min assist to  reduce premature spillage/timely eppiglottic closure prior, during and/or after  the swallow decreasing the risk of aspiration Effortful swallow 1 set of 10 repetitions Mod assist   STG 2c: 8 weeks:  Patient will be given a home exercise program and demonstrate understanding of the program with min assist. HEP 3-5 times per day, 3 sets of 10 each exercise     -effortful swallow  -Yawn  -Tongue base elevation against resistance    Mod assist            ASSESSMENT/PLAN       ASSESSMENT: Patient requires the skills of a therapist to provide mod to max assist to increase timeliness of swallow and increase tongue base strength to develop a safer swallow reducing risk of aspiration.   Progress:  Patient is demonstrating a more coordinated swallow reducing her risk of aspiration.   PLAN: Continue plan of care     Progress Note Due Date:3/24/25  Recertification Due Date:4/22/25     SIGNATURE: Elvira Howard, SLP, [place] License #: [id number]  Electronically Signed on 3/5/2025  Electronically Signed on 3/10/2025            Adult Outpatient Rehabiliation                                       MILD

## 2025-04-15 ENCOUNTER — RX RENEWAL (OUTPATIENT)
Age: 60
End: 2025-04-15

## 2025-05-05 ENCOUNTER — APPOINTMENT (OUTPATIENT)
Dept: INTERNAL MEDICINE | Facility: CLINIC | Age: 60
End: 2025-05-05
Payer: MEDICAID

## 2025-05-05 VITALS
TEMPERATURE: 98.1 F | SYSTOLIC BLOOD PRESSURE: 105 MMHG | OXYGEN SATURATION: 97 % | WEIGHT: 146 LBS | BODY MASS INDEX: 26.87 KG/M2 | DIASTOLIC BLOOD PRESSURE: 74 MMHG | HEART RATE: 71 BPM | HEIGHT: 62 IN

## 2025-05-05 DIAGNOSIS — M54.2 CERVICALGIA: ICD-10-CM

## 2025-05-05 DIAGNOSIS — K13.79 OTHER LESIONS OF ORAL MUCOSA: ICD-10-CM

## 2025-05-05 DIAGNOSIS — G89.29 CERVICALGIA: ICD-10-CM

## 2025-05-05 PROCEDURE — G2211 COMPLEX E/M VISIT ADD ON: CPT | Mod: NC

## 2025-05-05 PROCEDURE — 99213 OFFICE O/P EST LOW 20 MIN: CPT

## 2025-05-28 ENCOUNTER — EMERGENCY (EMERGENCY)
Facility: HOSPITAL | Age: 60
LOS: 1 days | End: 2025-05-28
Attending: EMERGENCY MEDICINE
Payer: MEDICAID

## 2025-05-28 VITALS
TEMPERATURE: 99 F | DIASTOLIC BLOOD PRESSURE: 82 MMHG | OXYGEN SATURATION: 97 % | SYSTOLIC BLOOD PRESSURE: 142 MMHG | HEIGHT: 62 IN | RESPIRATION RATE: 18 BRPM | WEIGHT: 147.71 LBS | HEART RATE: 69 BPM

## 2025-05-28 PROCEDURE — 99284 EMERGENCY DEPT VISIT MOD MDM: CPT

## 2025-05-28 PROCEDURE — 99282 EMERGENCY DEPT VISIT SF MDM: CPT

## 2025-05-28 NOTE — ED PROVIDER NOTE - NSFOLLOWUPINSTRUCTIONS_ED_ALL_ED_FT
Continue current home medications.  Take Tylenol 1000 mg every 6-8 hours and/or ibuprofen 600 mg every 6 hours as needed for pain.  Upon discharge, go directly to dental clinic.  Return to the ER for worsening swelling, severe pain, fevers, difficulty swallowing/breathing or any other concerning symptoms.

## 2025-05-28 NOTE — ED PROVIDER NOTE - ATTENDING APP SHARED VISIT CONTRIBUTION OF CARE
59-year-old female PMH hypothyroid, former smoker DC'd several months ago comes ER complains of swelling soft palate past several months.  Patient was seen by dental as an outpatient and referred to oral surgery she has been unable to secure same.  There is no fever chills chest pain short of breath palpitations.    Physical exam adult female awake alert GCS 15  HEENT normocephalic atraumatic.  Mouth: Soft tissue swelling left soft palate posterior nontender rubbery in consistency.  Chest clear A&P.  CV no rubs gallop murmur.  With soft bowel sounds  Neuro GCS 15 speech reveals no extremities.  Michael Davies MD, Facep

## 2025-05-28 NOTE — ED PROVIDER NOTE - NSFOLLOWUPCLINICS_GEN_ALL_ED_FT
[facility]  Dental  [address] [place] [id number]  Phone: [phone]  Fax:

## 2025-05-28 NOTE — ED PROVIDER NOTE - CLINICAL SUMMARY MEDICAL DECISION MAKING FREE TEXT BOX
Adult female presents with soft palate mass past several months, without fever chills,  Short of breath chest pain.  Patient is a former smoker.  Patient was attempting to follow-up with oral surgery unable to secure same  presents to ER.  Plan discussed with oral surgery if available today referred to the clinic if not continue further workup in the ER.  Michael Davies MD, Facep

## 2025-05-28 NOTE — ED ADULT TRIAGE NOTE - CHIEF COMPLAINT QUOTE
Pain in the upper left side of the mouth; went to Dentist and was referred to an Oral surgeon but was unable to get an appointment.

## 2025-05-28 NOTE — ED PROVIDER NOTE - PHYSICAL EXAMINATION
oral: boggy soft palate mass without tenderness.  no erythema/discharge.  Airway patent.  Uvula midline

## 2025-05-28 NOTE — ED PROVIDER NOTE - OBJECTIVE STATEMENT
59-year-old female with past medical history of hypothyroidism presenting with mucosal mass.  Patient reports for the past few months she has noted swelling and mild tenderness over the left aspect of her soft palate.  Patient followed up with her dentist and was instructed to see an oral surgeon.  Patient has tried to get an appointment, but has been unsuccessful so she decided to come to the emergency room.  Patient denies fever/chills, dysphagia, shortness of breath, weight loss.  Former smoker.

## 2025-05-28 NOTE — ED PROVIDER NOTE - PROGRESS NOTE DETAILS
I spoke with dental resident who states patient can come directly to the dental clinic.  There is an oral surgeon on staff today, but patient will be evaluated by general dental first and determine need for oral surgery and schedule appropriate follow-up.  Patient made aware and is agreeable with discharge to go directly to dental clinic.  Strict follow-up and return precautions provided. -Dimitris Rojas PA-C

## 2025-05-28 NOTE — ED PROVIDER NOTE - PATIENT PORTAL LINK FT
You can access the FollowMyHealth Patient Portal offered by [facility] by registering at the following website: [email or web address]. By joining [facility]’s FollowMyHealth portal, you will also be able to view your health information using other applications (apps) compatible with our system.

## 2025-05-30 ENCOUNTER — APPOINTMENT (OUTPATIENT)
Age: 60
End: 2025-05-30

## 2025-05-30 ENCOUNTER — APPOINTMENT (OUTPATIENT)
Age: 60
End: 2025-05-30
Payer: MEDICAID

## 2025-05-30 PROCEDURE — D0330 PANORAMIC RADIOGRAPHIC IMAGE: CPT

## 2025-05-30 PROCEDURE — D0140: CPT

## 2025-06-20 ENCOUNTER — APPOINTMENT (OUTPATIENT)
Age: 60
End: 2025-06-20

## 2025-06-20 PROCEDURE — 42100 BIOPSY ROOF OF MOUTH: CPT

## 2025-06-20 PROCEDURE — D0383: CPT

## 2025-06-20 PROCEDURE — 99204 OFFICE O/P NEW MOD 45 MIN: CPT | Mod: 25

## 2025-07-11 ENCOUNTER — APPOINTMENT (OUTPATIENT)
Age: 60
End: 2025-07-11
Payer: MEDICAID

## 2025-07-11 PROCEDURE — D0171: CPT | Mod: NC

## 2025-08-09 ENCOUNTER — OUTPATIENT (OUTPATIENT)
Dept: OUTPATIENT SERVICES | Facility: HOSPITAL | Age: 60
LOS: 1 days | End: 2025-08-09
Payer: MEDICAID

## 2025-08-09 ENCOUNTER — APPOINTMENT (OUTPATIENT)
Dept: CT IMAGING | Facility: CLINIC | Age: 60
End: 2025-08-09

## 2025-08-09 DIAGNOSIS — K13.79 OTHER LESIONS OF ORAL MUCOSA: ICD-10-CM

## 2025-08-09 PROCEDURE — 70487 CT MAXILLOFACIAL W/DYE: CPT

## 2025-08-09 PROCEDURE — 70487 CT MAXILLOFACIAL W/DYE: CPT | Mod: 26

## 2025-08-28 ENCOUNTER — OUTPATIENT (OUTPATIENT)
Dept: OUTPATIENT SERVICES | Facility: HOSPITAL | Age: 60
LOS: 1 days | End: 2025-08-28

## 2025-08-28 VITALS
RESPIRATION RATE: 16 BRPM | WEIGHT: 145.95 LBS | OXYGEN SATURATION: 97 % | HEIGHT: 62 IN | DIASTOLIC BLOOD PRESSURE: 65 MMHG | TEMPERATURE: 97 F | SYSTOLIC BLOOD PRESSURE: 100 MMHG | HEART RATE: 63 BPM

## 2025-08-28 DIAGNOSIS — D16.4 BENIGN NEOPLASM OF BONES OF SKULL AND FACE: ICD-10-CM

## 2025-08-28 DIAGNOSIS — Z98.890 OTHER SPECIFIED POSTPROCEDURAL STATES: Chronic | ICD-10-CM

## 2025-08-28 DIAGNOSIS — E03.9 HYPOTHYROIDISM, UNSPECIFIED: ICD-10-CM

## 2025-08-28 DIAGNOSIS — Z90.79 ACQUIRED ABSENCE OF OTHER GENITAL ORGAN(S): Chronic | ICD-10-CM

## 2025-08-28 LAB
BASOPHILS # BLD AUTO: 0.07 K/UL — SIGNIFICANT CHANGE UP (ref 0–0.2)
BASOPHILS NFR BLD AUTO: 0.8 % — SIGNIFICANT CHANGE UP (ref 0–2)
BLD GP AB SCN SERPL QL: NEGATIVE — SIGNIFICANT CHANGE UP
EOSINOPHIL # BLD AUTO: 0.1 K/UL — SIGNIFICANT CHANGE UP (ref 0–0.5)
EOSINOPHIL NFR BLD AUTO: 1.2 % — SIGNIFICANT CHANGE UP (ref 0–6)
HCT VFR BLD CALC: 39.4 % — SIGNIFICANT CHANGE UP (ref 34.5–45)
HGB BLD-MCNC: 13.4 G/DL — SIGNIFICANT CHANGE UP (ref 11.5–15.5)
IMM GRANULOCYTES NFR BLD AUTO: 0.4 % — SIGNIFICANT CHANGE UP (ref 0–0.9)
LYMPHOCYTES # BLD AUTO: 2.45 K/UL — SIGNIFICANT CHANGE UP (ref 1–3.3)
LYMPHOCYTES # BLD AUTO: 29.4 % — SIGNIFICANT CHANGE UP (ref 13–44)
MCHC RBC-ENTMCNC: 31.3 PG — SIGNIFICANT CHANGE UP (ref 27–34)
MCHC RBC-ENTMCNC: 34 G/DL — SIGNIFICANT CHANGE UP (ref 32–36)
MCV RBC AUTO: 92.1 FL — SIGNIFICANT CHANGE UP (ref 80–100)
MONOCYTES # BLD AUTO: 0.5 K/UL — SIGNIFICANT CHANGE UP (ref 0–0.9)
MONOCYTES NFR BLD AUTO: 6 % — SIGNIFICANT CHANGE UP (ref 2–14)
NEUTROPHILS # BLD AUTO: 5.17 K/UL — SIGNIFICANT CHANGE UP (ref 1.8–7.4)
NEUTROPHILS NFR BLD AUTO: 62.2 % — SIGNIFICANT CHANGE UP (ref 43–77)
PLATELET # BLD AUTO: 277 K/UL — SIGNIFICANT CHANGE UP (ref 150–400)
RBC # BLD: 4.28 M/UL — SIGNIFICANT CHANGE UP (ref 3.8–5.2)
RBC # FLD: 13.2 % — SIGNIFICANT CHANGE UP (ref 10.3–14.5)
RH IG SCN BLD-IMP: POSITIVE — SIGNIFICANT CHANGE UP
RH IG SCN BLD-IMP: POSITIVE — SIGNIFICANT CHANGE UP
WBC # BLD: 8.32 K/UL — SIGNIFICANT CHANGE UP (ref 3.8–10.5)
WBC # FLD AUTO: 8.32 K/UL — SIGNIFICANT CHANGE UP (ref 3.8–10.5)

## 2025-08-28 RX ORDER — LEVOTHYROXINE SODIUM 300 MCG
1 TABLET ORAL
Refills: 0 | DISCHARGE

## 2025-09-04 ENCOUNTER — RESULT REVIEW (OUTPATIENT)
Age: 60
End: 2025-09-04

## 2025-09-04 ENCOUNTER — TRANSCRIPTION ENCOUNTER (OUTPATIENT)
Age: 60
End: 2025-09-04

## 2025-09-04 ENCOUNTER — OUTPATIENT (OUTPATIENT)
Dept: INPATIENT UNIT | Facility: HOSPITAL | Age: 60
LOS: 1 days | End: 2025-09-04
Payer: MEDICAID

## 2025-09-04 VITALS
RESPIRATION RATE: 16 BRPM | WEIGHT: 145.95 LBS | TEMPERATURE: 98 F | SYSTOLIC BLOOD PRESSURE: 111 MMHG | OXYGEN SATURATION: 99 % | HEART RATE: 58 BPM | HEIGHT: 62 IN | DIASTOLIC BLOOD PRESSURE: 70 MMHG

## 2025-09-04 VITALS — OXYGEN SATURATION: 97 % | HEART RATE: 64 BPM | RESPIRATION RATE: 19 BRPM

## 2025-09-04 DIAGNOSIS — Z98.890 OTHER SPECIFIED POSTPROCEDURAL STATES: Chronic | ICD-10-CM

## 2025-09-04 DIAGNOSIS — D16.4 BENIGN NEOPLASM OF BONES OF SKULL AND FACE: ICD-10-CM

## 2025-09-04 DIAGNOSIS — Z90.79 ACQUIRED ABSENCE OF OTHER GENITAL ORGAN(S): Chronic | ICD-10-CM

## 2025-09-04 PROCEDURE — 88305 TISSUE EXAM BY PATHOLOGIST: CPT | Mod: 26

## 2025-09-04 RX ORDER — HYDROMORPHONE/SOD CHLOR,ISO/PF 2 MG/10 ML
0.5 SYRINGE (ML) INJECTION
Refills: 0 | Status: DISCONTINUED | OUTPATIENT
Start: 2025-09-04 | End: 2025-09-04

## 2025-09-04 RX ORDER — IBUPROFEN 200 MG
1 TABLET ORAL
Qty: 28 | Refills: 0
Start: 2025-09-04 | End: 2025-09-10

## 2025-09-04 RX ORDER — SODIUM CHLORIDE 9 G/1000ML
1000 INJECTION, SOLUTION INTRAVENOUS
Refills: 0 | Status: ACTIVE | OUTPATIENT
Start: 2025-09-04 | End: 2026-08-03

## 2025-09-04 RX ORDER — FENTANYL CITRATE-0.9 % NACL/PF 100MCG/2ML
50 SYRINGE (ML) INTRAVENOUS
Refills: 0 | Status: DISCONTINUED | OUTPATIENT
Start: 2025-09-04 | End: 2025-09-04

## 2025-09-04 RX ORDER — OXYCODONE HYDROCHLORIDE 30 MG/1
1 TABLET ORAL
Qty: 6 | Refills: 0
Start: 2025-09-04

## 2025-09-04 RX ORDER — ACETAMINOPHEN 500 MG/5ML
2 LIQUID (ML) ORAL
Qty: 56 | Refills: 0
Start: 2025-09-04 | End: 2025-09-10

## 2025-09-04 RX ORDER — OXYCODONE HYDROCHLORIDE 30 MG/1
5 TABLET ORAL ONCE
Refills: 0 | Status: DISCONTINUED | OUTPATIENT
Start: 2025-09-04 | End: 2025-09-04

## 2025-09-04 RX ORDER — FENTANYL CITRATE-0.9 % NACL/PF 100MCG/2ML
25 SYRINGE (ML) INTRAVENOUS
Refills: 0 | Status: DISCONTINUED | OUTPATIENT
Start: 2025-09-04 | End: 2025-09-04

## 2025-09-04 RX ORDER — ONDANSETRON HCL/PF 4 MG/2 ML
4 VIAL (ML) INJECTION ONCE
Refills: 0 | Status: ACTIVE | OUTPATIENT
Start: 2025-09-04 | End: 2026-08-03

## 2025-09-04 RX ADMIN — Medication 0.5 MILLIGRAM(S): at 13:43

## 2025-09-04 RX ADMIN — Medication 0.5 MILLIGRAM(S): at 13:58

## 2025-09-05 ENCOUNTER — RX RENEWAL (OUTPATIENT)
Age: 60
End: 2025-09-05

## 2025-09-09 LAB — SURGICAL PATHOLOGY STUDY: SIGNIFICANT CHANGE UP

## (undated) DEVICE — LABELS BLANK W PEN

## (undated) DEVICE — DRAPE SPLIT SHEET 77" X 120"

## (undated) DEVICE — VENODYNE/SCD SLEEVE CALF MEDIUM

## (undated) DEVICE — STRYKER COLORADO N-SERIES 3CM STRAIGHT

## (undated) DEVICE — PACK HEAD & NECK

## (undated) DEVICE — DRAPE 3/4 SHEET 52X76"

## (undated) DEVICE — ELCTR E/S NEEDLE 0.75"

## (undated) DEVICE — BIPOLAR FORCEP KIRWAN JEWELERS STR 4" X 0.4MM W 12FT CORD (GREEN)

## (undated) DEVICE — SUT CHROMIC 3-0 27" RB-1

## (undated) DEVICE — CANISTER DISPOSABLE THIN WALL 3000CC

## (undated) DEVICE — ELCTR BOVIE PENCIL SMOKE EVACUATION

## (undated) DEVICE — PROTECTOR HEEL / ELBOW FLUFFY

## (undated) DEVICE — ELCTR BOVIE TIP BLADE INSULATED 2.8" EDGE WITH SAFETY

## (undated) DEVICE — DRAPE MAGNETIC INSTRUMENT MEDIUM

## (undated) DEVICE — DRSG TELFA 3 X 8

## (undated) DEVICE — SOL IRR POUR H2O 500ML

## (undated) DEVICE — Device

## (undated) DEVICE — VAGINAL PACKING 2"

## (undated) DEVICE — ELCTR STRYKER NEPTUNE SMOKE EVACUATION PENCIL (GREEN)

## (undated) DEVICE — WARMING BLANKET LOWER ADULT

## (undated) DEVICE — BIPOLAR FORCEP STRYKER STANDARD 8" X 1MM (YELLOW)